# Patient Record
Sex: FEMALE | Race: WHITE | NOT HISPANIC OR LATINO | Employment: FULL TIME | URBAN - METROPOLITAN AREA
[De-identification: names, ages, dates, MRNs, and addresses within clinical notes are randomized per-mention and may not be internally consistent; named-entity substitution may affect disease eponyms.]

---

## 2017-01-01 ENCOUNTER — HOSPITAL ENCOUNTER (OUTPATIENT)
Facility: HOSPITAL | Age: 62
Setting detail: OBSERVATION
Discharge: HOME | End: 2017-05-25
Attending: EMERGENCY MEDICINE | Admitting: INTERNAL MEDICINE

## 2017-01-01 ENCOUNTER — APPOINTMENT (OUTPATIENT)
Dept: NUCLEAR MEDICINE | Facility: HOSPITAL | Age: 62
End: 2017-01-01

## 2017-01-01 ENCOUNTER — APPOINTMENT (OUTPATIENT)
Dept: CARDIOLOGY | Facility: HOSPITAL | Age: 62
End: 2017-01-01
Attending: INTERNAL MEDICINE

## 2017-01-01 ENCOUNTER — HOSPITAL ENCOUNTER (INPATIENT)
Facility: HOSPITAL | Age: 62
LOS: 2 days | Discharge: HOME | End: 2017-12-19
Attending: EMERGENCY MEDICINE | Admitting: INTERNAL MEDICINE

## 2017-01-01 ENCOUNTER — APPOINTMENT (OUTPATIENT)
Dept: CT IMAGING | Facility: HOSPITAL | Age: 62
End: 2017-01-01

## 2017-01-01 ENCOUNTER — APPOINTMENT (OUTPATIENT)
Dept: CARDIOLOGY | Facility: HOSPITAL | Age: 62
End: 2017-01-01

## 2017-01-01 ENCOUNTER — TRANSCRIBE ORDERS (OUTPATIENT)
Dept: ADMINISTRATIVE | Facility: HOSPITAL | Age: 62
End: 2017-01-01

## 2017-01-01 ENCOUNTER — APPOINTMENT (OUTPATIENT)
Dept: MRI IMAGING | Facility: HOSPITAL | Age: 62
End: 2017-01-01
Attending: INTERNAL MEDICINE

## 2017-01-01 ENCOUNTER — HOSPITAL ENCOUNTER (OUTPATIENT)
Dept: ULTRASOUND IMAGING | Facility: HOSPITAL | Age: 62
Discharge: HOME | End: 2017-12-07
Admitting: INTERNAL MEDICINE

## 2017-01-01 ENCOUNTER — HOSPITAL ENCOUNTER (OUTPATIENT)
Dept: CT IMAGING | Facility: HOSPITAL | Age: 62
Discharge: HOME | End: 2017-05-22
Admitting: INTERNAL MEDICINE

## 2017-01-01 ENCOUNTER — TELEPHONE (OUTPATIENT)
Dept: ONCOLOGY | Facility: CLINIC | Age: 62
End: 2017-01-01

## 2017-01-01 VITALS
DIASTOLIC BLOOD PRESSURE: 83 MMHG | HEART RATE: 77 BPM | BODY MASS INDEX: 34.66 KG/M2 | HEIGHT: 65 IN | WEIGHT: 208 LBS | TEMPERATURE: 97.8 F | OXYGEN SATURATION: 97 % | SYSTOLIC BLOOD PRESSURE: 149 MMHG | RESPIRATION RATE: 18 BRPM

## 2017-01-01 VITALS
RESPIRATION RATE: 20 BRPM | SYSTOLIC BLOOD PRESSURE: 171 MMHG | OXYGEN SATURATION: 97 % | HEART RATE: 66 BPM | DIASTOLIC BLOOD PRESSURE: 86 MMHG | BODY MASS INDEX: 33.27 KG/M2 | TEMPERATURE: 98.2 F | HEIGHT: 66 IN | WEIGHT: 207 LBS

## 2017-01-01 DIAGNOSIS — R10.30 LOWER ABDOMINAL PAIN: ICD-10-CM

## 2017-01-01 DIAGNOSIS — R10.2 PELVIC PAIN: ICD-10-CM

## 2017-01-01 DIAGNOSIS — K57.32 SIGMOID DIVERTICULITIS: ICD-10-CM

## 2017-01-01 DIAGNOSIS — I26.99 BILATERAL PULMONARY EMBOLISM (HCC): ICD-10-CM

## 2017-01-01 DIAGNOSIS — R10.30 LOWER ABDOMINAL PAIN: Primary | ICD-10-CM

## 2017-01-01 DIAGNOSIS — I26.09 OTHER ACUTE PULMONARY EMBOLISM WITH ACUTE COR PULMONALE (HCC): ICD-10-CM

## 2017-01-01 DIAGNOSIS — I82.401 ACUTE DEEP VEIN THROMBOSIS (DVT) OF RIGHT LOWER EXTREMITY, UNSPECIFIED VEIN (HCC): Primary | ICD-10-CM

## 2017-01-01 DIAGNOSIS — R10.2 PELVIC PAIN: Primary | ICD-10-CM

## 2017-01-01 DIAGNOSIS — R55 NEAR SYNCOPE: Primary | ICD-10-CM

## 2017-01-01 LAB
ALBUMIN SERPL-MCNC: 3.8 G/DL (ref 3.5–5.2)
ALBUMIN SERPL-MCNC: 4.1 G/DL (ref 3.5–5.2)
ALBUMIN/GLOB SERPL: 0.9 G/DL
ALBUMIN/GLOB SERPL: 1.1 G/DL
ALP SERPL-CCNC: 101 U/L (ref 39–117)
ALP SERPL-CCNC: 91 U/L (ref 39–117)
ALT SERPL W P-5'-P-CCNC: 19 U/L (ref 1–33)
ALT SERPL W P-5'-P-CCNC: 20 U/L (ref 1–33)
ANION GAP SERPL CALCULATED.3IONS-SCNC: 10.9 MMOL/L
ANION GAP SERPL CALCULATED.3IONS-SCNC: 13 MMOL/L
ANION GAP SERPL CALCULATED.3IONS-SCNC: 13.5 MMOL/L
ANION GAP SERPL CALCULATED.3IONS-SCNC: 13.9 MMOL/L
ANION GAP SERPL CALCULATED.3IONS-SCNC: 7.7 MMOL/L
AORTIC DIMENSIONLESS INDEX: 0.9 (DI)
APTT PPP: 107.5 SECONDS (ref 22.7–35.4)
APTT PPP: 123.2 SECONDS (ref 22.7–35.4)
APTT PPP: 29.4 SECONDS (ref 22.7–35.4)
APTT PPP: 63.2 SECONDS (ref 22.7–35.4)
APTT PPP: 64.4 SECONDS (ref 22.7–35.4)
APTT PPP: 67.9 SECONDS (ref 22.7–35.4)
APTT PPP: 92.2 SECONDS (ref 22.7–35.4)
AST SERPL-CCNC: 23 U/L (ref 1–32)
AST SERPL-CCNC: 26 U/L (ref 1–32)
BASOPHILS # BLD AUTO: 0.02 10*3/MM3 (ref 0–0.2)
BASOPHILS # BLD AUTO: 0.03 10*3/MM3 (ref 0–0.2)
BASOPHILS # BLD AUTO: 0.04 10*3/MM3 (ref 0–0.2)
BASOPHILS NFR BLD AUTO: 0.2 % (ref 0–1.5)
BASOPHILS NFR BLD AUTO: 0.3 % (ref 0–1.5)
BASOPHILS NFR BLD AUTO: 0.3 % (ref 0–1.5)
BASOPHILS NFR BLD AUTO: 0.4 % (ref 0–1.5)
BH CV ECHO MEAS - ACS: 1.8 CM
BH CV ECHO MEAS - ACS: 1.9 CM
BH CV ECHO MEAS - AI DEC SLOPE: 180.8 CM/SEC^2
BH CV ECHO MEAS - AI MAX PG: 63.3 MMHG
BH CV ECHO MEAS - AI MAX VEL: 397.5 CM/SEC
BH CV ECHO MEAS - AI P1/2T: 644.1 MSEC
BH CV ECHO MEAS - AO MAX PG: 9 MMHG
BH CV ECHO MEAS - AO MEAN PG (FULL): 1 MMHG
BH CV ECHO MEAS - AO MEAN PG (FULL): 2 MMHG
BH CV ECHO MEAS - AO MEAN PG: 5 MMHG
BH CV ECHO MEAS - AO MEAN PG: 5 MMHG
BH CV ECHO MEAS - AO ROOT AREA (BSA CORRECTED): 1.2
BH CV ECHO MEAS - AO ROOT AREA (BSA CORRECTED): 1.3
BH CV ECHO MEAS - AO ROOT AREA: 4.5 CM^2
BH CV ECHO MEAS - AO ROOT AREA: 5.7 CM^2
BH CV ECHO MEAS - AO ROOT DIAM: 2.4 CM
BH CV ECHO MEAS - AO ROOT DIAM: 2.7 CM
BH CV ECHO MEAS - AO V2 MAX: 146 CM/SEC
BH CV ECHO MEAS - AO V2 MEAN: 107 CM/SEC
BH CV ECHO MEAS - AO V2 MEAN: 108 CM/SEC
BH CV ECHO MEAS - AO V2 VTI: 24.1 CM
BH CV ECHO MEAS - AO V2 VTI: 34.6 CM
BH CV ECHO MEAS - AVA(I,A): 2.1 CM^2
BH CV ECHO MEAS - AVA(I,A): 2.7 CM^2
BH CV ECHO MEAS - AVA(I,D): 2.1 CM^2
BH CV ECHO MEAS - AVA(I,D): 2.7 CM^2
BH CV ECHO MEAS - BSA(HAYCOCK): 2.1 M^2
BH CV ECHO MEAS - BSA(HAYCOCK): 2.1 M^2
BH CV ECHO MEAS - BSA: 2 M^2
BH CV ECHO MEAS - BSA: 2 M^2
BH CV ECHO MEAS - BZI_BMI: 33.4 KILOGRAMS/M^2
BH CV ECHO MEAS - BZI_BMI: 34.6 KILOGRAMS/M^2
BH CV ECHO MEAS - BZI_METRIC_HEIGHT: 165.1 CM
BH CV ECHO MEAS - BZI_METRIC_HEIGHT: 167.6 CM
BH CV ECHO MEAS - BZI_METRIC_WEIGHT: 93.9 KG
BH CV ECHO MEAS - BZI_METRIC_WEIGHT: 94.3 KG
BH CV ECHO MEAS - CONTRAST EF (2CH): 74.6 ML/M^2
BH CV ECHO MEAS - CONTRAST EF (2CH): 75.2 ML/M^2
BH CV ECHO MEAS - CONTRAST EF 4CH: 74.6 ML/M^2
BH CV ECHO MEAS - CONTRAST EF 4CH: 76.5 ML/M^2
BH CV ECHO MEAS - EDV(CUBED): 39.3 ML
BH CV ECHO MEAS - EDV(CUBED): 85.2 ML
BH CV ECHO MEAS - EDV(MOD-SP2): 117 ML
BH CV ECHO MEAS - EDV(MOD-SP2): 59 ML
BH CV ECHO MEAS - EDV(MOD-SP4): 118 ML
BH CV ECHO MEAS - EDV(MOD-SP4): 68 ML
BH CV ECHO MEAS - EDV(TEICH): 47.4 ML
BH CV ECHO MEAS - EDV(TEICH): 87.7 ML
BH CV ECHO MEAS - EF(CUBED): 79.6 %
BH CV ECHO MEAS - EF(CUBED): 81.7 %
BH CV ECHO MEAS - EF(MOD-SP2): 74.6 %
BH CV ECHO MEAS - EF(MOD-SP2): 75.2 %
BH CV ECHO MEAS - EF(MOD-SP4): 74.6 %
BH CV ECHO MEAS - EF(MOD-SP4): 76.5 %
BH CV ECHO MEAS - EF(TEICH): 73.2 %
BH CV ECHO MEAS - EF(TEICH): 74.5 %
BH CV ECHO MEAS - ESV(CUBED): 15.6 ML
BH CV ECHO MEAS - ESV(CUBED): 8 ML
BH CV ECHO MEAS - ESV(MOD-SP2): 15 ML
BH CV ECHO MEAS - ESV(MOD-SP2): 29 ML
BH CV ECHO MEAS - ESV(MOD-SP4): 16 ML
BH CV ECHO MEAS - ESV(MOD-SP4): 30 ML
BH CV ECHO MEAS - ESV(TEICH): 12.7 ML
BH CV ECHO MEAS - ESV(TEICH): 22.3 ML
BH CV ECHO MEAS - FS: 41.2 %
BH CV ECHO MEAS - FS: 43.2 %
BH CV ECHO MEAS - IVS/LVPW: 0.8
BH CV ECHO MEAS - IVS/LVPW: 1
BH CV ECHO MEAS - IVSD: 0.8 CM
BH CV ECHO MEAS - IVSD: 1.1 CM
BH CV ECHO MEAS - LAT PEAK E' VEL: 10 CM/SEC
BH CV ECHO MEAS - LAT PEAK E' VEL: 11 CM/SEC
BH CV ECHO MEAS - LV DIASTOLIC VOL/BSA (35-75): 33.8 ML/M^2
BH CV ECHO MEAS - LV DIASTOLIC VOL/BSA (35-75): 58.1 ML/M^2
BH CV ECHO MEAS - LV MASS(C)D: 168.9 GRAMS
BH CV ECHO MEAS - LV MASS(C)D: 84.9 GRAMS
BH CV ECHO MEAS - LV MASS(C)DI: 42.2 GRAMS/M^2
BH CV ECHO MEAS - LV MASS(C)DI: 83.2 GRAMS/M^2
BH CV ECHO MEAS - LV MEAN PG: 3 MMHG
BH CV ECHO MEAS - LV MEAN PG: 4 MMHG
BH CV ECHO MEAS - LV SYSTOLIC VOL/BSA (12-30): 14.8 ML/M^2
BH CV ECHO MEAS - LV SYSTOLIC VOL/BSA (12-30): 8 ML/M^2
BH CV ECHO MEAS - LV V1 MAX: 125 CM/SEC
BH CV ECHO MEAS - LV V1 MEAN: 75.8 CM/SEC
BH CV ECHO MEAS - LV V1 MEAN: 90.7 CM/SEC
BH CV ECHO MEAS - LV V1 VTI: 20.7 CM
BH CV ECHO MEAS - LV V1 VTI: 28.2 CM
BH CV ECHO MEAS - LVIDD: 3.4 CM
BH CV ECHO MEAS - LVIDD: 4.4 CM
BH CV ECHO MEAS - LVIDS: 2 CM
BH CV ECHO MEAS - LVIDS: 2.5 CM
BH CV ECHO MEAS - LVLD AP2: 6.9 CM
BH CV ECHO MEAS - LVLD AP2: 8.4 CM
BH CV ECHO MEAS - LVLD AP4: 6.8 CM
BH CV ECHO MEAS - LVLD AP4: 8.4 CM
BH CV ECHO MEAS - LVLS AP2: 5.5 CM
BH CV ECHO MEAS - LVLS AP2: 6 CM
BH CV ECHO MEAS - LVLS AP4: 4.6 CM
BH CV ECHO MEAS - LVLS AP4: 6.3 CM
BH CV ECHO MEAS - LVOT AREA (M): 2.5 CM^2
BH CV ECHO MEAS - LVOT AREA (M): 3.1 CM^2
BH CV ECHO MEAS - LVOT AREA: 2.5 CM^2
BH CV ECHO MEAS - LVOT AREA: 3.1 CM^2
BH CV ECHO MEAS - LVOT DIAM: 1.8 CM
BH CV ECHO MEAS - LVOT DIAM: 2 CM
BH CV ECHO MEAS - LVPWD: 1 CM
BH CV ECHO MEAS - LVPWD: 1.1 CM
BH CV ECHO MEAS - MED PEAK E' VEL: 11 CM/SEC
BH CV ECHO MEAS - MED PEAK E' VEL: 7 CM/SEC
BH CV ECHO MEAS - MR MAX PG: 49.6 MMHG
BH CV ECHO MEAS - MR MAX VEL: 352 CM/SEC
BH CV ECHO MEAS - MV A DUR: 0.1 SEC
BH CV ECHO MEAS - MV A DUR: 0.11 SEC
BH CV ECHO MEAS - MV A MAX VEL: 101 CM/SEC
BH CV ECHO MEAS - MV A MAX VEL: 102 CM/SEC
BH CV ECHO MEAS - MV DEC SLOPE: 278 CM/SEC^2
BH CV ECHO MEAS - MV DEC SLOPE: 380 CM/SEC^2
BH CV ECHO MEAS - MV DEC TIME: 0.19 SEC
BH CV ECHO MEAS - MV DEC TIME: 0.22 SEC
BH CV ECHO MEAS - MV E MAX VEL: 63.2 CM/SEC
BH CV ECHO MEAS - MV E MAX VEL: 94.3 CM/SEC
BH CV ECHO MEAS - MV E/A: 0.62
BH CV ECHO MEAS - MV E/A: 0.93
BH CV ECHO MEAS - MV MAX PG: 7 MMHG
BH CV ECHO MEAS - MV MEAN PG: 2 MMHG
BH CV ECHO MEAS - MV MEAN PG: 2 MMHG
BH CV ECHO MEAS - MV P1/2T MAX VEL: 56.2 CM/SEC
BH CV ECHO MEAS - MV P1/2T MAX VEL: 99.1 CM/SEC
BH CV ECHO MEAS - MV P1/2T: 59.2 MSEC
BH CV ECHO MEAS - MV P1/2T: 76.4 MSEC
BH CV ECHO MEAS - MV V2 MEAN: 61.9 CM/SEC
BH CV ECHO MEAS - MV V2 MEAN: 66.6 CM/SEC
BH CV ECHO MEAS - MV V2 VTI: 16.9 CM
BH CV ECHO MEAS - MV V2 VTI: 34.4 CM
BH CV ECHO MEAS - MVA P1/2T LCG: 2.2 CM^2
BH CV ECHO MEAS - MVA P1/2T LCG: 3.9 CM^2
BH CV ECHO MEAS - MVA(P1/2T): 2.9 CM^2
BH CV ECHO MEAS - MVA(P1/2T): 3.7 CM^2
BH CV ECHO MEAS - MVA(VTI): 2.1 CM^2
BH CV ECHO MEAS - MVA(VTI): 3.8 CM^2
BH CV ECHO MEAS - PA ACC SLOPE: 19.3 CM/SEC^2
BH CV ECHO MEAS - PA ACC SLOPE: 654 CM/SEC^2
BH CV ECHO MEAS - PA ACC TIME: 0.07 SEC
BH CV ECHO MEAS - PA ACC TIME: 0.11 SEC
BH CV ECHO MEAS - PA MAX PG (FULL): 0.84 MMHG
BH CV ECHO MEAS - PA MAX PG (FULL): 1.4 MMHG
BH CV ECHO MEAS - PA MAX PG: 2.1 MMHG
BH CV ECHO MEAS - PA MAX PG: 3.5 MMHG
BH CV ECHO MEAS - PA PR(ACCEL): 30 MMHG
BH CV ECHO MEAS - PA PR(ACCEL): 47.5 MMHG
BH CV ECHO MEAS - PA V2 MAX: 72.1 CM/SEC
BH CV ECHO MEAS - PA V2 MAX: 93.7 CM/SEC
BH CV ECHO MEAS - PULM A REVS DUR: 0.13 SEC
BH CV ECHO MEAS - PULM A REVS VEL: 38.5 CM/SEC
BH CV ECHO MEAS - PULM DIAS VEL: 34.6 CM/SEC
BH CV ECHO MEAS - PULM S/D: 1.8
BH CV ECHO MEAS - PULM SYS VEL: 63.7 CM/SEC
BH CV ECHO MEAS - PVA(V,A): 1 CM^2
BH CV ECHO MEAS - PVA(V,A): 1.8 CM^2
BH CV ECHO MEAS - PVA(V,D): 1 CM^2
BH CV ECHO MEAS - PVA(V,D): 1.8 CM^2
BH CV ECHO MEAS - QP/QS: 0.32
BH CV ECHO MEAS - QP/QS: 0.36
BH CV ECHO MEAS - RAP SYSTOLE: 3 MMHG
BH CV ECHO MEAS - RAP SYSTOLE: 8 MMHG
BH CV ECHO MEAS - RV MAX PG: 1.2 MMHG
BH CV ECHO MEAS - RV MAX PG: 2.1 MMHG
BH CV ECHO MEAS - RV MEAN PG: 1 MMHG
BH CV ECHO MEAS - RV MEAN PG: 1 MMHG
BH CV ECHO MEAS - RV V1 MAX: 55.6 CM/SEC
BH CV ECHO MEAS - RV V1 MAX: 72.2 CM/SEC
BH CV ECHO MEAS - RV V1 MEAN: 43.3 CM/SEC
BH CV ECHO MEAS - RV V1 MEAN: 48 CM/SEC
BH CV ECHO MEAS - RV V1 VTI: 10.3 CM
BH CV ECHO MEAS - RV V1 VTI: 17.4 CM
BH CV ECHO MEAS - RVOT AREA: 1.3 CM^2
BH CV ECHO MEAS - RVOT AREA: 2.3 CM^2
BH CV ECHO MEAS - RVOT DIAM: 1.3 CM
BH CV ECHO MEAS - RVOT DIAM: 1.7 CM
BH CV ECHO MEAS - RVSP: 31.1 MMHG
BH CV ECHO MEAS - RVSP: 50.3 MMHG
BH CV ECHO MEAS - SI(AO): 68.6 ML/M^2
BH CV ECHO MEAS - SI(AO): 77.1 ML/M^2
BH CV ECHO MEAS - SI(CUBED): 15.6 ML/M^2
BH CV ECHO MEAS - SI(CUBED): 34.3 ML/M^2
BH CV ECHO MEAS - SI(LVOT): 32.3 ML/M^2
BH CV ECHO MEAS - SI(LVOT): 35.4 ML/M^2
BH CV ECHO MEAS - SI(MOD-SP2): 21.9 ML/M^2
BH CV ECHO MEAS - SI(MOD-SP2): 43.4 ML/M^2
BH CV ECHO MEAS - SI(MOD-SP4): 25.9 ML/M^2
BH CV ECHO MEAS - SI(MOD-SP4): 43.4 ML/M^2
BH CV ECHO MEAS - SI(TEICH): 17.3 ML/M^2
BH CV ECHO MEAS - SI(TEICH): 32.2 ML/M^2
BH CV ECHO MEAS - SV(AO): 138 ML
BH CV ECHO MEAS - SV(AO): 156.5 ML
BH CV ECHO MEAS - SV(CUBED): 31.3 ML
BH CV ECHO MEAS - SV(CUBED): 69.6 ML
BH CV ECHO MEAS - SV(LVOT): 65 ML
BH CV ECHO MEAS - SV(LVOT): 71.8 ML
BH CV ECHO MEAS - SV(MOD-SP2): 44 ML
BH CV ECHO MEAS - SV(MOD-SP2): 88 ML
BH CV ECHO MEAS - SV(MOD-SP4): 52 ML
BH CV ECHO MEAS - SV(MOD-SP4): 88 ML
BH CV ECHO MEAS - SV(RVOT): 23.1 ML
BH CV ECHO MEAS - SV(RVOT): 23.4 ML
BH CV ECHO MEAS - SV(TEICH): 34.7 ML
BH CV ECHO MEAS - SV(TEICH): 65.4 ML
BH CV ECHO MEAS - TAPSE (>1.6): 1.1 CM2
BH CV ECHO MEAS - TAPSE (>1.6): 1.4 CM2
BH CV ECHO MEAS - TR MAX VEL: 265 CM/SEC
BH CV ECHO MEAS - TR MAX VEL: 325 CM/SEC
BH CV LOW VAS RIGHT DISTAL FEMORAL SPONT: 1
BH CV LOW VAS RIGHT GASTRONEMIUS VESSEL: 1
BH CV LOW VAS RIGHT LESSER SAPH VESSEL: 1
BH CV LOW VAS RIGHT MID FEMORAL SPONT: 1
BH CV LOW VAS RIGHT PERONEAL VESSEL: 1
BH CV LOW VAS RIGHT POPLITEAL SPONT: 1
BH CV LOW VAS RIGHT POSTERIOR TIBIAL VESSEL: 1
BH CV LOW VAS RIGHT PROXIMAL FEMORAL SPONT: 1
BH CV LOWER VASCULAR LEFT COMMON FEMORAL AUGMENT: NORMAL
BH CV LOWER VASCULAR LEFT COMMON FEMORAL COMPETENT: NORMAL
BH CV LOWER VASCULAR LEFT COMMON FEMORAL COMPRESS: NORMAL
BH CV LOWER VASCULAR LEFT COMMON FEMORAL PHASIC: NORMAL
BH CV LOWER VASCULAR LEFT COMMON FEMORAL SPONT: NORMAL
BH CV LOWER VASCULAR RIGHT COMMON FEMORAL AUGMENT: NORMAL
BH CV LOWER VASCULAR RIGHT COMMON FEMORAL COMPETENT: NORMAL
BH CV LOWER VASCULAR RIGHT COMMON FEMORAL COMPRESS: NORMAL
BH CV LOWER VASCULAR RIGHT COMMON FEMORAL PHASIC: NORMAL
BH CV LOWER VASCULAR RIGHT COMMON FEMORAL SPONT: NORMAL
BH CV LOWER VASCULAR RIGHT DISTAL FEMORAL COMPRESS: NORMAL
BH CV LOWER VASCULAR RIGHT DISTAL FEMORAL THROMBUS: NORMAL
BH CV LOWER VASCULAR RIGHT GASTRONEMIUS COMPRESS: NORMAL
BH CV LOWER VASCULAR RIGHT GASTRONEMIUS THROMBUS: NORMAL
BH CV LOWER VASCULAR RIGHT GREATER SAPH AK COMPRESS: NORMAL
BH CV LOWER VASCULAR RIGHT GREATER SAPH BK COMPRESS: NORMAL
BH CV LOWER VASCULAR RIGHT LESSER SAPH COMPRESS: NORMAL
BH CV LOWER VASCULAR RIGHT LESSER SAPH THROMBUS: NORMAL
BH CV LOWER VASCULAR RIGHT MID FEMORAL AUGMENT: NORMAL
BH CV LOWER VASCULAR RIGHT MID FEMORAL COMPRESS: NORMAL
BH CV LOWER VASCULAR RIGHT MID FEMORAL PHASIC: NORMAL
BH CV LOWER VASCULAR RIGHT MID FEMORAL SPONT: NORMAL
BH CV LOWER VASCULAR RIGHT MID FEMORAL THROMBUS: NORMAL
BH CV LOWER VASCULAR RIGHT PERONEAL COMPRESS: NORMAL
BH CV LOWER VASCULAR RIGHT PERONEAL THROMBUS: NORMAL
BH CV LOWER VASCULAR RIGHT POPLITEAL AUGMENT: NORMAL
BH CV LOWER VASCULAR RIGHT POPLITEAL COMPRESS: NORMAL
BH CV LOWER VASCULAR RIGHT POPLITEAL PHASIC: NORMAL
BH CV LOWER VASCULAR RIGHT POPLITEAL SPONT: NORMAL
BH CV LOWER VASCULAR RIGHT POPLITEAL THROMBUS: NORMAL
BH CV LOWER VASCULAR RIGHT POSTERIOR TIBIAL COMPRESS: NORMAL
BH CV LOWER VASCULAR RIGHT POSTERIOR TIBIAL THROMBUS: NORMAL
BH CV LOWER VASCULAR RIGHT PROXIMAL FEMORAL COMPRESS: NORMAL
BH CV LOWER VASCULAR RIGHT PROXIMAL FEMORAL THROMBUS: NORMAL
BH CV LOWER VASCULAR RIGHT SAPHENOFEMORAL JUNCTION AUGMENT: NORMAL
BH CV LOWER VASCULAR RIGHT SAPHENOFEMORAL JUNCTION COMPETENT: NORMAL
BH CV LOWER VASCULAR RIGHT SAPHENOFEMORAL JUNCTION COMPRESS: NORMAL
BH CV LOWER VASCULAR RIGHT SAPHENOFEMORAL JUNCTION PHASIC: NORMAL
BH CV LOWER VASCULAR RIGHT SAPHENOFEMORAL JUNCTION SPONT: NORMAL
BH CV STRESS BP STAGE 1: NORMAL
BH CV STRESS BP STAGE 2: NORMAL
BH CV STRESS DURATION MIN STAGE 1: 3
BH CV STRESS DURATION MIN STAGE 2: 2
BH CV STRESS DURATION SEC STAGE 1: 0
BH CV STRESS DURATION SEC STAGE 2: 0
BH CV STRESS GRADE STAGE 1: 10
BH CV STRESS GRADE STAGE 2: 12
BH CV STRESS HR STAGE 1: 123
BH CV STRESS HR STAGE 2: 144
BH CV STRESS METS STAGE 1: 4.6
BH CV STRESS METS STAGE 2: 6.2
BH CV STRESS PROTOCOL 1: NORMAL
BH CV STRESS RECOVERY BP: NORMAL MMHG
BH CV STRESS RECOVERY HR: 77 BPM
BH CV STRESS SPEED STAGE 1: 1.7
BH CV STRESS SPEED STAGE 2: 2.5
BH CV STRESS STAGE 1: 1
BH CV STRESS STAGE 2: 2
BH CV VAS BP RIGHT ARM: NORMAL MMHG
BH CV XLRA - RV BASE: 2.7 CM
BH CV XLRA - RV BASE: 3.2 CM
BH CV XLRA - TDI S': 10 CM/SEC
BH CV XLRA - TDI S': 9 CM/SEC
BILIRUB SERPL-MCNC: 0.3 MG/DL (ref 0.1–1.2)
BILIRUB SERPL-MCNC: 0.4 MG/DL (ref 0.1–1.2)
BUN BLD-MCNC: 13 MG/DL (ref 8–23)
BUN BLD-MCNC: 15 MG/DL (ref 8–23)
BUN BLD-MCNC: 16 MG/DL (ref 8–23)
BUN BLD-MCNC: 19 MG/DL (ref 8–23)
BUN BLD-MCNC: 22 MG/DL (ref 8–23)
BUN/CREAT SERPL: 13.8 (ref 7–25)
BUN/CREAT SERPL: 15.6 (ref 7–25)
BUN/CREAT SERPL: 18.4 (ref 7–25)
BUN/CREAT SERPL: 19.6 (ref 7–25)
BUN/CREAT SERPL: 20.2 (ref 7–25)
CALCIUM SPEC-SCNC: 9.2 MG/DL (ref 8.6–10.5)
CALCIUM SPEC-SCNC: 9.3 MG/DL (ref 8.6–10.5)
CALCIUM SPEC-SCNC: 9.5 MG/DL (ref 8.6–10.5)
CALCIUM SPEC-SCNC: 9.8 MG/DL (ref 8.6–10.5)
CALCIUM SPEC-SCNC: 9.8 MG/DL (ref 8.6–10.5)
CHLORIDE SERPL-SCNC: 104 MMOL/L (ref 98–107)
CHLORIDE SERPL-SCNC: 98 MMOL/L (ref 98–107)
CHOLEST SERPL-MCNC: 171 MG/DL (ref 0–200)
CO2 SERPL-SCNC: 22 MMOL/L (ref 22–29)
CO2 SERPL-SCNC: 22.5 MMOL/L (ref 22–29)
CO2 SERPL-SCNC: 24.1 MMOL/L (ref 22–29)
CO2 SERPL-SCNC: 25.1 MMOL/L (ref 22–29)
CO2 SERPL-SCNC: 27.3 MMOL/L (ref 22–29)
CREAT BLD-MCNC: 0.87 MG/DL (ref 0.57–1)
CREAT BLD-MCNC: 0.94 MG/DL (ref 0.57–1)
CREAT BLD-MCNC: 0.94 MG/DL (ref 0.57–1)
CREAT BLD-MCNC: 0.96 MG/DL (ref 0.57–1)
CREAT BLD-MCNC: 1.12 MG/DL (ref 0.57–1)
CREAT BLDA-MCNC: 0.9 MG/DL (ref 0.6–1.3)
DEPRECATED RDW RBC AUTO: 41.8 FL (ref 37–54)
DEPRECATED RDW RBC AUTO: 42.7 FL (ref 37–54)
DEPRECATED RDW RBC AUTO: 44.7 FL (ref 37–54)
DEPRECATED RDW RBC AUTO: 44.8 FL (ref 37–54)
DEPRECATED RDW RBC AUTO: 45.2 FL (ref 37–54)
DEPRECATED RDW RBC AUTO: 46.5 FL (ref 37–54)
E/E' RATIO: 12
E/E' RATIO: 6
EOSINOPHIL # BLD AUTO: 0.07 10*3/MM3 (ref 0–0.7)
EOSINOPHIL # BLD AUTO: 0.07 10*3/MM3 (ref 0–0.7)
EOSINOPHIL # BLD AUTO: 0.09 10*3/MM3 (ref 0–0.7)
EOSINOPHIL # BLD AUTO: 0.09 10*3/MM3 (ref 0–0.7)
EOSINOPHIL # BLD AUTO: 0.11 10*3/MM3 (ref 0–0.7)
EOSINOPHIL # BLD AUTO: 0.12 10*3/MM3 (ref 0–0.7)
EOSINOPHIL NFR BLD AUTO: 0.8 % (ref 0.3–6.2)
EOSINOPHIL NFR BLD AUTO: 1.4 % (ref 0.3–6.2)
EOSINOPHIL NFR BLD AUTO: 1.6 % (ref 0.3–6.2)
ERYTHROCYTE [DISTWIDTH] IN BLOOD BY AUTOMATED COUNT: 13.3 % (ref 11.7–13)
ERYTHROCYTE [DISTWIDTH] IN BLOOD BY AUTOMATED COUNT: 13.4 % (ref 11.7–13)
ERYTHROCYTE [DISTWIDTH] IN BLOOD BY AUTOMATED COUNT: 13.6 % (ref 11.7–13)
ERYTHROCYTE [DISTWIDTH] IN BLOOD BY AUTOMATED COUNT: 13.7 % (ref 11.7–13)
ERYTHROCYTE [DISTWIDTH] IN BLOOD BY AUTOMATED COUNT: 13.8 % (ref 11.7–13)
ERYTHROCYTE [DISTWIDTH] IN BLOOD BY AUTOMATED COUNT: 14 % (ref 11.7–13)
GFR SERPL CREATININE-BSD FRML MDRD: 49 ML/MIN/1.73
GFR SERPL CREATININE-BSD FRML MDRD: 59 ML/MIN/1.73
GFR SERPL CREATININE-BSD FRML MDRD: 60 ML/MIN/1.73
GFR SERPL CREATININE-BSD FRML MDRD: 60 ML/MIN/1.73
GFR SERPL CREATININE-BSD FRML MDRD: 66 ML/MIN/1.73
GLOBULIN UR ELPH-MCNC: 3.6 GM/DL
GLOBULIN UR ELPH-MCNC: 4.1 GM/DL
GLUCOSE BLD-MCNC: 106 MG/DL (ref 65–99)
GLUCOSE BLD-MCNC: 115 MG/DL (ref 65–99)
GLUCOSE BLD-MCNC: 118 MG/DL (ref 65–99)
GLUCOSE BLD-MCNC: 119 MG/DL (ref 65–99)
GLUCOSE BLD-MCNC: 131 MG/DL (ref 65–99)
HBA1C MFR BLD: 5.49 % (ref 4.8–5.6)
HCT VFR BLD AUTO: 40.7 % (ref 35.6–45.5)
HCT VFR BLD AUTO: 41.4 % (ref 35.6–45.5)
HCT VFR BLD AUTO: 42.1 % (ref 35.6–45.5)
HCT VFR BLD AUTO: 45.3 % (ref 35.6–45.5)
HCT VFR BLD AUTO: 45.9 % (ref 35.6–45.5)
HCT VFR BLD AUTO: 46.9 % (ref 35.6–45.5)
HCT VFR BLDA CALC: 39 % (ref 38–51)
HDLC SERPL-MCNC: 66 MG/DL (ref 40–60)
HGB BLD-MCNC: 13.4 G/DL (ref 11.9–15.5)
HGB BLD-MCNC: 13.4 G/DL (ref 11.9–15.5)
HGB BLD-MCNC: 14.2 G/DL (ref 11.9–15.5)
HGB BLD-MCNC: 14.6 G/DL (ref 11.9–15.5)
HGB BLD-MCNC: 14.8 G/DL (ref 11.9–15.5)
HGB BLD-MCNC: 14.8 G/DL (ref 11.9–15.5)
HGB BLDA-MCNC: 13.3 G/DL (ref 12–17)
IMM GRANULOCYTES # BLD: 0.02 10*3/MM3 (ref 0–0.03)
IMM GRANULOCYTES # BLD: 0.03 10*3/MM3 (ref 0–0.03)
IMM GRANULOCYTES NFR BLD: 0.2 % (ref 0–0.5)
IMM GRANULOCYTES NFR BLD: 0.3 % (ref 0–0.5)
IMM GRANULOCYTES NFR BLD: 0.3 % (ref 0–0.5)
INR PPP: 1.06 (ref 0.9–1.1)
INR PPP: 1.09 (ref 0.9–1.1)
INR PPP: 1.14 (ref 0.9–1.1)
LDLC SERPL CALC-MCNC: 92 MG/DL (ref 0–100)
LDLC/HDLC SERPL: 1.4 {RATIO}
LEFT ATRIUM VOLUME INDEX: 15 ML/M2
LEFT ATRIUM VOLUME INDEX: 19 ML/M2
LV EF NUC BP: 73 %
LYMPHOCYTES # BLD AUTO: 1.46 10*3/MM3 (ref 0.9–4.8)
LYMPHOCYTES # BLD AUTO: 1.87 10*3/MM3 (ref 0.9–4.8)
LYMPHOCYTES # BLD AUTO: 2.06 10*3/MM3 (ref 0.9–4.8)
LYMPHOCYTES # BLD AUTO: 2.2 10*3/MM3 (ref 0.9–4.8)
LYMPHOCYTES # BLD AUTO: 2.41 10*3/MM3 (ref 0.9–4.8)
LYMPHOCYTES # BLD AUTO: 2.55 10*3/MM3 (ref 0.9–4.8)
LYMPHOCYTES NFR BLD AUTO: 12.6 % (ref 19.6–45.3)
LYMPHOCYTES NFR BLD AUTO: 23.2 % (ref 19.6–45.3)
LYMPHOCYTES NFR BLD AUTO: 24.3 % (ref 19.6–45.3)
LYMPHOCYTES NFR BLD AUTO: 25.5 % (ref 19.6–45.3)
LYMPHOCYTES NFR BLD AUTO: 26.5 % (ref 19.6–45.3)
LYMPHOCYTES NFR BLD AUTO: 26.7 % (ref 19.6–45.3)
MAXIMAL PREDICTED HEART RATE: 156 BPM
MAXIMAL PREDICTED HEART RATE: 157 BPM
MCH RBC QN AUTO: 28.6 PG (ref 26.9–32)
MCH RBC QN AUTO: 28.8 PG (ref 26.9–32)
MCH RBC QN AUTO: 28.9 PG (ref 26.9–32)
MCH RBC QN AUTO: 29 PG (ref 26.9–32)
MCH RBC QN AUTO: 29.1 PG (ref 26.9–32)
MCH RBC QN AUTO: 29.3 PG (ref 26.9–32)
MCHC RBC AUTO-ENTMCNC: 31.1 G/DL (ref 32.4–36.3)
MCHC RBC AUTO-ENTMCNC: 31.8 G/DL (ref 32.4–36.3)
MCHC RBC AUTO-ENTMCNC: 32.2 G/DL (ref 32.4–36.3)
MCHC RBC AUTO-ENTMCNC: 32.7 G/DL (ref 32.4–36.3)
MCHC RBC AUTO-ENTMCNC: 32.9 G/DL (ref 32.4–36.3)
MCHC RBC AUTO-ENTMCNC: 34.3 G/DL (ref 32.4–36.3)
MCV RBC AUTO: 85.5 FL (ref 80.5–98.2)
MCV RBC AUTO: 87 FL (ref 80.5–98.2)
MCV RBC AUTO: 88.8 FL (ref 80.5–98.2)
MCV RBC AUTO: 90.3 FL (ref 80.5–98.2)
MCV RBC AUTO: 90.4 FL (ref 80.5–98.2)
MCV RBC AUTO: 92.7 FL (ref 80.5–98.2)
MONOCYTES # BLD AUTO: 0.51 10*3/MM3 (ref 0.2–1.2)
MONOCYTES # BLD AUTO: 0.55 10*3/MM3 (ref 0.2–1.2)
MONOCYTES # BLD AUTO: 0.56 10*3/MM3 (ref 0.2–1.2)
MONOCYTES # BLD AUTO: 0.72 10*3/MM3 (ref 0.2–1.2)
MONOCYTES # BLD AUTO: 0.72 10*3/MM3 (ref 0.2–1.2)
MONOCYTES # BLD AUTO: 0.97 10*3/MM3 (ref 0.2–1.2)
MONOCYTES NFR BLD AUTO: 5.9 % (ref 5–12)
MONOCYTES NFR BLD AUTO: 6.5 % (ref 5–12)
MONOCYTES NFR BLD AUTO: 6.5 % (ref 5–12)
MONOCYTES NFR BLD AUTO: 7.9 % (ref 5–12)
MONOCYTES NFR BLD AUTO: 8 % (ref 5–12)
MONOCYTES NFR BLD AUTO: 8.3 % (ref 5–12)
NEUTROPHILS # BLD AUTO: 4.42 10*3/MM3 (ref 1.9–8.1)
NEUTROPHILS # BLD AUTO: 5.75 10*3/MM3 (ref 1.9–8.1)
NEUTROPHILS # BLD AUTO: 5.77 10*3/MM3 (ref 1.9–8.1)
NEUTROPHILS # BLD AUTO: 5.85 10*3/MM3 (ref 1.9–8.1)
NEUTROPHILS # BLD AUTO: 7.6 10*3/MM3 (ref 1.9–8.1)
NEUTROPHILS # BLD AUTO: 9.05 10*3/MM3 (ref 1.9–8.1)
NEUTROPHILS NFR BLD AUTO: 63.1 % (ref 42.7–76)
NEUTROPHILS NFR BLD AUTO: 64.2 % (ref 42.7–76)
NEUTROPHILS NFR BLD AUTO: 66.8 % (ref 42.7–76)
NEUTROPHILS NFR BLD AUTO: 68 % (ref 42.7–76)
NEUTROPHILS NFR BLD AUTO: 69.1 % (ref 42.7–76)
NEUTROPHILS NFR BLD AUTO: 77.7 % (ref 42.7–76)
NRBC BLD MANUAL-RTO: 0 /100 WBC (ref 0–0)
NRBC BLD MANUAL-RTO: 0 /100 WBC (ref 0–0)
NT-PROBNP SERPL-MCNC: 1788 PG/ML (ref 5–900)
PERCENT MAX PREDICTED HR: 91.72 %
PLATELET # BLD AUTO: 134 10*3/MM3 (ref 140–500)
PLATELET # BLD AUTO: 138 10*3/MM3 (ref 140–500)
PLATELET # BLD AUTO: 144 10*3/MM3 (ref 140–500)
PLATELET # BLD AUTO: 155 10*3/MM3 (ref 140–500)
PLATELET # BLD AUTO: 218 10*3/MM3 (ref 140–500)
PLATELET # BLD AUTO: 233 10*3/MM3 (ref 140–500)
PMV BLD AUTO: 10.2 FL (ref 6–12)
PMV BLD AUTO: 10.2 FL (ref 6–12)
PMV BLD AUTO: 9.4 FL (ref 6–12)
PMV BLD AUTO: 9.5 FL (ref 6–12)
PMV BLD AUTO: 9.5 FL (ref 6–12)
PMV BLD AUTO: 9.6 FL (ref 6–12)
POTASSIUM BLD-SCNC: 3.8 MMOL/L (ref 3.5–5.2)
POTASSIUM BLD-SCNC: 4.2 MMOL/L (ref 3.5–5.2)
POTASSIUM BLD-SCNC: 4.9 MMOL/L (ref 3.5–5.2)
PROT SERPL-MCNC: 7.7 G/DL (ref 6–8.5)
PROT SERPL-MCNC: 7.9 G/DL (ref 6–8.5)
PROTHROMBIN TIME: 13.4 SECONDS (ref 11.7–14.2)
PROTHROMBIN TIME: 13.7 SECONDS (ref 11.7–14.2)
PROTHROMBIN TIME: 14.2 SECONDS (ref 11.7–14.2)
RBC # BLD AUTO: 4.66 10*6/MM3 (ref 3.9–5.2)
RBC # BLD AUTO: 4.68 10*6/MM3 (ref 3.9–5.2)
RBC # BLD AUTO: 4.84 10*6/MM3 (ref 3.9–5.2)
RBC # BLD AUTO: 5.06 10*6/MM3 (ref 3.9–5.2)
RBC # BLD AUTO: 5.08 10*6/MM3 (ref 3.9–5.2)
RBC # BLD AUTO: 5.1 10*6/MM3 (ref 3.9–5.2)
SAO2 % BLDA: 80 % (ref 95–98)
SODIUM BLD-SCNC: 136 MMOL/L (ref 136–145)
SODIUM BLD-SCNC: 139 MMOL/L (ref 136–145)
SODIUM BLD-SCNC: 139 MMOL/L (ref 136–145)
SODIUM BLD-SCNC: 140 MMOL/L (ref 136–145)
SODIUM BLD-SCNC: 140 MMOL/L (ref 136–145)
STRESS BASELINE BP: NORMAL MMHG
STRESS BASELINE HR: 64 BPM
STRESS PERCENT HR: 108 %
STRESS POST ESTIMATED WORKLOAD: 6.2 METS
STRESS POST EXERCISE DUR MIN: 5 MIN
STRESS POST EXERCISE DUR SEC: 0 SEC
STRESS POST PEAK BP: NORMAL MMHG
STRESS POST PEAK HR: 144 BPM
STRESS TARGET HR: 133 BPM
STRESS TARGET HR: 133 BPM
TRIGL SERPL-MCNC: 63 MG/DL (ref 0–150)
TROPONIN T SERPL-MCNC: 0.03 NG/ML (ref 0–0.03)
TROPONIN T SERPL-MCNC: <0.01 NG/ML (ref 0–0.03)
VLDLC SERPL-MCNC: 12.6 MG/DL (ref 5–40)
WBC NRBC COR # BLD: 11 10*3/MM3 (ref 4.5–10.7)
WBC NRBC COR # BLD: 11.63 10*3/MM3 (ref 4.5–10.7)
WBC NRBC COR # BLD: 7 10*3/MM3 (ref 4.5–10.7)
WBC NRBC COR # BLD: 8.49 10*3/MM3 (ref 4.5–10.7)
WBC NRBC COR # BLD: 8.62 10*3/MM3 (ref 4.5–10.7)
WBC NRBC COR # BLD: 9.11 10*3/MM3 (ref 4.5–10.7)

## 2017-01-01 PROCEDURE — G0378 HOSPITAL OBSERVATION PER HR: HCPCS

## 2017-01-01 PROCEDURE — 93005 ELECTROCARDIOGRAM TRACING: CPT | Performed by: EMERGENCY MEDICINE

## 2017-01-01 PROCEDURE — 85025 COMPLETE CBC W/AUTO DIFF WBC: CPT | Performed by: INTERNAL MEDICINE

## 2017-01-01 PROCEDURE — 80048 BASIC METABOLIC PNL TOTAL CA: CPT | Performed by: INTERNAL MEDICINE

## 2017-01-01 PROCEDURE — 85598 HEXAGNAL PHOSPH PLTLT NEUTRL: CPT | Performed by: INTERNAL MEDICINE

## 2017-01-01 PROCEDURE — 96365 THER/PROPH/DIAG IV INF INIT: CPT

## 2017-01-01 PROCEDURE — 83036 HEMOGLOBIN GLYCOSYLATED A1C: CPT | Performed by: INTERNAL MEDICINE

## 2017-01-01 PROCEDURE — 78452 HT MUSCLE IMAGE SPECT MULT: CPT

## 2017-01-01 PROCEDURE — 93306 TTE W/DOPPLER COMPLETE: CPT | Performed by: INTERNAL MEDICINE

## 2017-01-01 PROCEDURE — 99285 EMERGENCY DEPT VISIT HI MDM: CPT

## 2017-01-01 PROCEDURE — 93568 NJX CAR CTH NSLC P-ART ANGRP: CPT | Performed by: INTERNAL MEDICINE

## 2017-01-01 PROCEDURE — 93005 ELECTROCARDIOGRAM TRACING: CPT | Performed by: INTERNAL MEDICINE

## 2017-01-01 PROCEDURE — 4A023N6 MEASUREMENT OF CARDIAC SAMPLING AND PRESSURE, RIGHT HEART, PERCUTANEOUS APPROACH: ICD-10-PCS | Performed by: INTERNAL MEDICINE

## 2017-01-01 PROCEDURE — 85018 HEMOGLOBIN: CPT

## 2017-01-01 PROCEDURE — 93010 ELECTROCARDIOGRAM REPORT: CPT | Performed by: INTERNAL MEDICINE

## 2017-01-01 PROCEDURE — 74177 CT ABD & PELVIS W/CONTRAST: CPT

## 2017-01-01 PROCEDURE — 99232 SBSQ HOSP IP/OBS MODERATE 35: CPT | Performed by: INTERNAL MEDICINE

## 2017-01-01 PROCEDURE — 96375 TX/PRO/DX INJ NEW DRUG ADDON: CPT

## 2017-01-01 PROCEDURE — 76830 TRANSVAGINAL US NON-OB: CPT

## 2017-01-01 PROCEDURE — 96360 HYDRATION IV INFUSION INIT: CPT

## 2017-01-01 PROCEDURE — A9500 TC99M SESTAMIBI: HCPCS | Performed by: INTERNAL MEDICINE

## 2017-01-01 PROCEDURE — 83880 ASSAY OF NATRIURETIC PEPTIDE: CPT | Performed by: NURSE PRACTITIONER

## 2017-01-01 PROCEDURE — 0 IOPAMIDOL PER 1 ML: Performed by: INTERNAL MEDICINE

## 2017-01-01 PROCEDURE — 25010000002 FENTANYL CITRATE (PF) 100 MCG/2ML SOLUTION: Performed by: INTERNAL MEDICINE

## 2017-01-01 PROCEDURE — 36415 COLL VENOUS BLD VENIPUNCTURE: CPT | Performed by: INTERNAL MEDICINE

## 2017-01-01 PROCEDURE — 85670 THROMBIN TIME PLASMA: CPT | Performed by: INTERNAL MEDICINE

## 2017-01-01 PROCEDURE — 85730 THROMBOPLASTIN TIME PARTIAL: CPT | Performed by: INTERNAL MEDICINE

## 2017-01-01 PROCEDURE — 85014 HEMATOCRIT: CPT

## 2017-01-01 PROCEDURE — 81241 F5 GENE: CPT | Performed by: INTERNAL MEDICINE

## 2017-01-01 PROCEDURE — 84484 ASSAY OF TROPONIN QUANT: CPT | Performed by: EMERGENCY MEDICINE

## 2017-01-01 PROCEDURE — C1894 INTRO/SHEATH, NON-LASER: HCPCS | Performed by: INTERNAL MEDICINE

## 2017-01-01 PROCEDURE — 78452 HT MUSCLE IMAGE SPECT MULT: CPT | Performed by: INTERNAL MEDICINE

## 2017-01-01 PROCEDURE — 71275 CT ANGIOGRAPHY CHEST: CPT

## 2017-01-01 PROCEDURE — 93016 CV STRESS TEST SUPVJ ONLY: CPT | Performed by: INTERNAL MEDICINE

## 2017-01-01 PROCEDURE — 80053 COMPREHEN METABOLIC PANEL: CPT | Performed by: NURSE PRACTITIONER

## 2017-01-01 PROCEDURE — 96361 HYDRATE IV INFUSION ADD-ON: CPT

## 2017-01-01 PROCEDURE — 93306 TTE W/DOPPLER COMPLETE: CPT

## 2017-01-01 PROCEDURE — 84484 ASSAY OF TROPONIN QUANT: CPT | Performed by: INTERNAL MEDICINE

## 2017-01-01 PROCEDURE — 85732 THROMBOPLASTIN TIME PARTIAL: CPT | Performed by: INTERNAL MEDICINE

## 2017-01-01 PROCEDURE — 80061 LIPID PANEL: CPT | Performed by: INTERNAL MEDICINE

## 2017-01-01 PROCEDURE — 25010000002 HEPARIN (PORCINE) PER 1000 UNITS: Performed by: INTERNAL MEDICINE

## 2017-01-01 PROCEDURE — 93005 ELECTROCARDIOGRAM TRACING: CPT | Performed by: NURSE PRACTITIONER

## 2017-01-01 PROCEDURE — 85705 THROMBOPLASTIN INHIBITION: CPT | Performed by: INTERNAL MEDICINE

## 2017-01-01 PROCEDURE — 0 TECHNETIUM SESTAMIBI: Performed by: INTERNAL MEDICINE

## 2017-01-01 PROCEDURE — 81240 F2 GENE: CPT | Performed by: INTERNAL MEDICINE

## 2017-01-01 PROCEDURE — 99231 SBSQ HOSP IP/OBS SF/LOW 25: CPT | Performed by: INTERNAL MEDICINE

## 2017-01-01 PROCEDURE — 93017 CV STRESS TEST TRACING ONLY: CPT

## 2017-01-01 PROCEDURE — 93451 RIGHT HEART CATH: CPT | Performed by: INTERNAL MEDICINE

## 2017-01-01 PROCEDURE — 25010000002 MIDAZOLAM PER 1 MG: Performed by: INTERNAL MEDICINE

## 2017-01-01 PROCEDURE — A9577 INJ MULTIHANCE: HCPCS | Performed by: INTERNAL MEDICINE

## 2017-01-01 PROCEDURE — 0 GADOBENATE DIMEGLUMINE 529 MG/ML SOLUTION: Performed by: INTERNAL MEDICINE

## 2017-01-01 PROCEDURE — 99243 OFF/OP CNSLTJ NEW/EST LOW 30: CPT | Performed by: INTERNAL MEDICINE

## 2017-01-01 PROCEDURE — 80053 COMPREHEN METABOLIC PANEL: CPT | Performed by: EMERGENCY MEDICINE

## 2017-01-01 PROCEDURE — 99283 EMERGENCY DEPT VISIT LOW MDM: CPT

## 2017-01-01 PROCEDURE — 99223 1ST HOSP IP/OBS HIGH 75: CPT | Performed by: INTERNAL MEDICINE

## 2017-01-01 PROCEDURE — 85025 COMPLETE CBC W/AUTO DIFF WBC: CPT | Performed by: NURSE PRACTITIONER

## 2017-01-01 PROCEDURE — 99204 OFFICE O/P NEW MOD 45 MIN: CPT | Performed by: RADIOLOGY

## 2017-01-01 PROCEDURE — B31T1ZZ FLUOROSCOPY OF LEFT PULMONARY ARTERY USING LOW OSMOLAR CONTRAST: ICD-10-PCS | Performed by: INTERNAL MEDICINE

## 2017-01-01 PROCEDURE — B31S1ZZ FLUOROSCOPY OF RIGHT PULMONARY ARTERY USING LOW OSMOLAR CONTRAST: ICD-10-PCS | Performed by: INTERNAL MEDICINE

## 2017-01-01 PROCEDURE — 93971 EXTREMITY STUDY: CPT

## 2017-01-01 PROCEDURE — 85613 RUSSELL VIPER VENOM DILUTED: CPT | Performed by: INTERNAL MEDICINE

## 2017-01-01 PROCEDURE — B2141ZZ FLUOROSCOPY OF RIGHT HEART USING LOW OSMOLAR CONTRAST: ICD-10-PCS | Performed by: INTERNAL MEDICINE

## 2017-01-01 PROCEDURE — C1769 GUIDE WIRE: HCPCS | Performed by: INTERNAL MEDICINE

## 2017-01-01 PROCEDURE — 70450 CT HEAD/BRAIN W/O DYE: CPT

## 2017-01-01 PROCEDURE — 0 IOPAMIDOL PER 1 ML: Performed by: EMERGENCY MEDICINE

## 2017-01-01 PROCEDURE — 85610 PROTHROMBIN TIME: CPT | Performed by: NURSE PRACTITIONER

## 2017-01-01 PROCEDURE — 70549 MR ANGIOGRAPH NECK W/O&W/DYE: CPT

## 2017-01-01 PROCEDURE — 85025 COMPLETE CBC W/AUTO DIFF WBC: CPT | Performed by: EMERGENCY MEDICINE

## 2017-01-01 PROCEDURE — 99238 HOSP IP/OBS DSCHRG MGMT 30/<: CPT | Performed by: INTERNAL MEDICINE

## 2017-01-01 PROCEDURE — C1893 INTRO/SHEATH, FIXED,NON-PEEL: HCPCS | Performed by: INTERNAL MEDICINE

## 2017-01-01 PROCEDURE — 99214 OFFICE O/P EST MOD 30 MIN: CPT | Performed by: INTERNAL MEDICINE

## 2017-01-01 PROCEDURE — 85610 PROTHROMBIN TIME: CPT | Performed by: INTERNAL MEDICINE

## 2017-01-01 PROCEDURE — 70553 MRI BRAIN STEM W/O & W/DYE: CPT

## 2017-01-01 PROCEDURE — 36415 COLL VENOUS BLD VENIPUNCTURE: CPT

## 2017-01-01 PROCEDURE — 25010000002 HYDRALAZINE PER 20 MG: Performed by: NURSE PRACTITIONER

## 2017-01-01 PROCEDURE — 0 IOPAMIDOL 61 % SOLUTION: Performed by: INTERNAL MEDICINE

## 2017-01-01 PROCEDURE — 84484 ASSAY OF TROPONIN QUANT: CPT | Performed by: NURSE PRACTITIONER

## 2017-01-01 PROCEDURE — 76856 US EXAM PELVIC COMPLETE: CPT

## 2017-01-01 PROCEDURE — 70544 MR ANGIOGRAPHY HEAD W/O DYE: CPT

## 2017-01-01 PROCEDURE — 94799 UNLISTED PULMONARY SVC/PX: CPT

## 2017-01-01 PROCEDURE — 25010000002 CHLOROPROCAINE PER 30 ML: Performed by: INTERNAL MEDICINE

## 2017-01-01 PROCEDURE — 82565 ASSAY OF CREATININE: CPT

## 2017-01-01 PROCEDURE — 93018 CV STRESS TEST I&R ONLY: CPT | Performed by: INTERNAL MEDICINE

## 2017-01-01 RX ORDER — ONDANSETRON 4 MG/1
4 TABLET, FILM COATED ORAL EVERY 6 HOURS PRN
Status: DISCONTINUED | OUTPATIENT
Start: 2017-01-01 | End: 2017-01-01 | Stop reason: HOSPADM

## 2017-01-01 RX ORDER — NITROGLYCERIN 0.4 MG/1
0.4 TABLET SUBLINGUAL
Status: DISCONTINUED | OUTPATIENT
Start: 2017-01-01 | End: 2017-01-01 | Stop reason: HOSPADM

## 2017-01-01 RX ORDER — CIPROFLOXACIN 500 MG/1
500 TABLET, FILM COATED ORAL 2 TIMES DAILY
COMMUNITY
End: 2017-01-01

## 2017-01-01 RX ORDER — SODIUM CHLORIDE 9 MG/ML
INJECTION, SOLUTION INTRAVENOUS CONTINUOUS PRN
Status: DISCONTINUED | OUTPATIENT
Start: 2017-01-01 | End: 2017-01-01 | Stop reason: HOSPADM

## 2017-01-01 RX ORDER — HYDRALAZINE HYDROCHLORIDE 20 MG/ML
10 INJECTION INTRAMUSCULAR; INTRAVENOUS EVERY 6 HOURS PRN
Status: DISCONTINUED | OUTPATIENT
Start: 2017-01-01 | End: 2017-01-01 | Stop reason: HOSPADM

## 2017-01-01 RX ORDER — HEPARIN SODIUM 5000 [USP'U]/ML
80 INJECTION, SOLUTION INTRAVENOUS; SUBCUTANEOUS ONCE
Status: DISCONTINUED | OUTPATIENT
Start: 2017-01-01 | End: 2017-01-01 | Stop reason: SDUPTHER

## 2017-01-01 RX ORDER — B-COMPLEX WITH VITAMIN C
100 TABLET ORAL DAILY
COMMUNITY

## 2017-01-01 RX ORDER — MELATONIN
1000 DAILY
COMMUNITY

## 2017-01-01 RX ORDER — ATORVASTATIN CALCIUM 80 MG/1
80 TABLET, FILM COATED ORAL NIGHTLY
Status: DISCONTINUED | OUTPATIENT
Start: 2017-01-01 | End: 2017-01-01 | Stop reason: CLARIF

## 2017-01-01 RX ORDER — SODIUM CHLORIDE 0.9 % (FLUSH) 0.9 %
1-10 SYRINGE (ML) INJECTION AS NEEDED
Status: DISCONTINUED | OUTPATIENT
Start: 2017-01-01 | End: 2017-01-01 | Stop reason: HOSPADM

## 2017-01-01 RX ORDER — ASPIRIN 325 MG
325 TABLET ORAL DAILY
Status: DISCONTINUED | OUTPATIENT
Start: 2017-01-01 | End: 2017-01-01 | Stop reason: HOSPADM

## 2017-01-01 RX ORDER — ROSUVASTATIN CALCIUM 40 MG/1
40 TABLET, COATED ORAL NIGHTLY
Status: DISCONTINUED | OUTPATIENT
Start: 2017-01-01 | End: 2017-01-01

## 2017-01-01 RX ORDER — ASPIRIN 300 MG/1
300 SUPPOSITORY RECTAL DAILY
Status: DISCONTINUED | OUTPATIENT
Start: 2017-01-01 | End: 2017-01-01 | Stop reason: HOSPADM

## 2017-01-01 RX ORDER — MAGNESIUM HYDROXIDE/ALUMINUM HYDROXICE/SIMETHICONE 120; 1200; 1200 MG/30ML; MG/30ML; MG/30ML
30 SUSPENSION ORAL EVERY 6 HOURS PRN
Status: DISCONTINUED | OUTPATIENT
Start: 2017-01-01 | End: 2017-01-01 | Stop reason: HOSPADM

## 2017-01-01 RX ORDER — ACETAMINOPHEN 325 MG/1
650 TABLET ORAL EVERY 4 HOURS PRN
Status: DISCONTINUED | OUTPATIENT
Start: 2017-01-01 | End: 2017-01-01 | Stop reason: HOSPADM

## 2017-01-01 RX ORDER — LEVOFLOXACIN 750 MG/1
750 TABLET ORAL DAILY
Status: DISCONTINUED | OUTPATIENT
Start: 2017-01-01 | End: 2017-01-01 | Stop reason: HOSPADM

## 2017-01-01 RX ORDER — LOSARTAN POTASSIUM 50 MG/1
50 TABLET ORAL DAILY
Qty: 30 TABLET | Refills: 0 | Status: SHIPPED | OUTPATIENT
Start: 2017-01-01

## 2017-01-01 RX ORDER — HEPARIN SODIUM 5000 [USP'U]/ML
40-80 INJECTION, SOLUTION INTRAVENOUS; SUBCUTANEOUS EVERY 6 HOURS PRN
Status: DISCONTINUED | OUTPATIENT
Start: 2017-01-01 | End: 2017-01-01 | Stop reason: HOSPADM

## 2017-01-01 RX ORDER — ROSUVASTATIN CALCIUM 10 MG/1
10 TABLET, COATED ORAL NIGHTLY
Status: DISCONTINUED | OUTPATIENT
Start: 2017-01-01 | End: 2017-01-01 | Stop reason: HOSPADM

## 2017-01-01 RX ORDER — ACETAMINOPHEN 325 MG/1
650 TABLET ORAL EVERY 6 HOURS PRN
Status: DISCONTINUED | OUTPATIENT
Start: 2017-01-01 | End: 2017-01-01 | Stop reason: HOSPADM

## 2017-01-01 RX ORDER — ONDANSETRON 2 MG/ML
4 INJECTION INTRAMUSCULAR; INTRAVENOUS EVERY 6 HOURS PRN
Status: DISCONTINUED | OUTPATIENT
Start: 2017-01-01 | End: 2017-01-01 | Stop reason: HOSPADM

## 2017-01-01 RX ORDER — ONDANSETRON 4 MG/1
4 TABLET, ORALLY DISINTEGRATING ORAL EVERY 6 HOURS PRN
Status: DISCONTINUED | OUTPATIENT
Start: 2017-01-01 | End: 2017-01-01 | Stop reason: HOSPADM

## 2017-01-01 RX ORDER — SODIUM CHLORIDE 0.9 % (FLUSH) 0.9 %
10 SYRINGE (ML) INJECTION AS NEEDED
Status: DISCONTINUED | OUTPATIENT
Start: 2017-01-01 | End: 2017-01-01 | Stop reason: HOSPADM

## 2017-01-01 RX ORDER — METRONIDAZOLE 500 MG/1
500 TABLET ORAL 3 TIMES DAILY
COMMUNITY

## 2017-01-01 RX ORDER — HEPARIN SODIUM 5000 [USP'U]/ML
40-80 INJECTION, SOLUTION INTRAVENOUS; SUBCUTANEOUS EVERY 6 HOURS PRN
Status: DISCONTINUED | OUTPATIENT
Start: 2017-01-01 | End: 2017-01-01 | Stop reason: SDUPTHER

## 2017-01-01 RX ORDER — FENTANYL CITRATE 50 UG/ML
INJECTION, SOLUTION INTRAMUSCULAR; INTRAVENOUS AS NEEDED
Status: DISCONTINUED | OUTPATIENT
Start: 2017-01-01 | End: 2017-01-01 | Stop reason: HOSPADM

## 2017-01-01 RX ORDER — HEPARIN SODIUM 5000 [USP'U]/ML
80 INJECTION, SOLUTION INTRAVENOUS; SUBCUTANEOUS ONCE
Status: COMPLETED | OUTPATIENT
Start: 2017-01-01 | End: 2017-01-01

## 2017-01-01 RX ORDER — CEFDINIR 300 MG/1
300 CAPSULE ORAL 2 TIMES DAILY
COMMUNITY

## 2017-01-01 RX ORDER — SODIUM CHLORIDE 9 MG/ML
75 INJECTION, SOLUTION INTRAVENOUS CONTINUOUS
Status: DISCONTINUED | OUTPATIENT
Start: 2017-01-01 | End: 2017-01-01 | Stop reason: HOSPADM

## 2017-01-01 RX ORDER — MIDAZOLAM HYDROCHLORIDE 1 MG/ML
INJECTION INTRAMUSCULAR; INTRAVENOUS AS NEEDED
Status: DISCONTINUED | OUTPATIENT
Start: 2017-01-01 | End: 2017-01-01 | Stop reason: HOSPADM

## 2017-01-01 RX ORDER — METRONIDAZOLE 500 MG/1
500 TABLET ORAL 3 TIMES DAILY
Status: DISCONTINUED | OUTPATIENT
Start: 2017-01-01 | End: 2017-01-01 | Stop reason: HOSPADM

## 2017-01-01 RX ADMIN — SODIUM CHLORIDE 1000 ML: 9 INJECTION, SOLUTION INTRAVENOUS at 21:35

## 2017-01-01 RX ADMIN — ASPIRIN 325 MG: 325 TABLET ORAL at 11:55

## 2017-01-01 RX ADMIN — HYDRALAZINE HYDROCHLORIDE 10 MG: 20 INJECTION INTRAMUSCULAR; INTRAVENOUS at 17:35

## 2017-01-01 RX ADMIN — ROSUVASTATIN CALCIUM 10 MG: 10 TABLET, FILM COATED ORAL at 19:53

## 2017-01-01 RX ADMIN — HEPARIN SODIUM 7500 UNITS: 5000 INJECTION, SOLUTION INTRAVENOUS; SUBCUTANEOUS at 14:57

## 2017-01-01 RX ADMIN — IOPAMIDOL 85 ML: 612 INJECTION, SOLUTION INTRAVENOUS at 15:45

## 2017-01-01 RX ADMIN — GADOBENATE DIMEGLUMINE 19 ML: 529 INJECTION, SOLUTION INTRAVENOUS at 08:37

## 2017-01-01 RX ADMIN — IOPAMIDOL 90 ML: 755 INJECTION, SOLUTION INTRAVENOUS at 14:12

## 2017-01-01 RX ADMIN — ACETAMINOPHEN 650 MG: 325 TABLET ORAL at 00:19

## 2017-01-01 RX ADMIN — LEVOFLOXACIN 750 MG: 750 TABLET, FILM COATED ORAL at 11:55

## 2017-01-01 RX ADMIN — HEPARIN SODIUM 14.7 UNITS/KG/HR: 10000 INJECTION, SOLUTION INTRAVENOUS at 08:54

## 2017-01-01 RX ADMIN — HEPARIN SODIUM 15.9 UNITS/KG/HR: 10000 INJECTION, SOLUTION INTRAVENOUS at 15:01

## 2017-01-01 RX ADMIN — APIXABAN 10 MG: 2.5 TABLET, FILM COATED ORAL at 11:08

## 2017-01-01 RX ADMIN — SODIUM CHLORIDE 75 ML/HR: 9 INJECTION, SOLUTION INTRAVENOUS at 12:28

## 2017-01-01 RX ADMIN — ASPIRIN 325 MG: 325 TABLET ORAL at 09:31

## 2017-01-01 RX ADMIN — SODIUM CHLORIDE 75 ML/HR: 9 INJECTION, SOLUTION INTRAVENOUS at 16:10

## 2017-01-01 RX ADMIN — LEVOFLOXACIN 750 MG: 750 TABLET, FILM COATED ORAL at 09:31

## 2017-01-01 RX ADMIN — Medication 1 DOSE: at 09:25

## 2017-01-01 RX ADMIN — SODIUM CHLORIDE 75 ML/HR: 9 INJECTION, SOLUTION INTRAVENOUS at 23:15

## 2017-01-01 RX ADMIN — METRONIDAZOLE 500 MG: 500 TABLET, FILM COATED ORAL at 09:31

## 2017-01-01 RX ADMIN — Medication 1 DOSE: at 06:25

## 2017-01-01 RX ADMIN — SODIUM CHLORIDE 75 ML/HR: 9 INJECTION, SOLUTION INTRAVENOUS at 05:47

## 2017-01-01 RX ADMIN — METRONIDAZOLE 500 MG: 500 TABLET, FILM COATED ORAL at 16:10

## 2017-01-01 RX ADMIN — METRONIDAZOLE 500 MG: 500 TABLET, FILM COATED ORAL at 11:55

## 2017-01-01 RX ADMIN — SODIUM CHLORIDE 75 ML/HR: 9 INJECTION, SOLUTION INTRAVENOUS at 06:16

## 2017-01-01 RX ADMIN — METRONIDAZOLE 500 MG: 500 TABLET, FILM COATED ORAL at 23:14

## 2017-05-23 PROBLEM — R55 NEAR SYNCOPE: Status: ACTIVE | Noted: 2017-01-01

## 2017-05-24 PROBLEM — K57.92 DIVERTICULITIS: Status: ACTIVE | Noted: 2017-01-01

## 2017-05-24 PROBLEM — R07.9 CHEST PAIN: Status: ACTIVE | Noted: 2017-01-01

## 2017-05-24 PROBLEM — R68.84 JAW PAIN: Status: ACTIVE | Noted: 2017-01-01

## 2017-05-24 PROBLEM — R47.01 EXPRESSIVE APHASIA: Status: ACTIVE | Noted: 2017-01-01

## 2017-05-25 PROBLEM — I10 ESSENTIAL HYPERTENSION: Status: ACTIVE | Noted: 2017-01-01

## 2017-12-17 PROBLEM — I26.99 BILATERAL PULMONARY EMBOLISM (HCC): Status: ACTIVE | Noted: 2017-01-01

## 2017-12-17 NOTE — H&P
Date of Hospital Visit: [unfilled]  Encounter Provider: Ivan Giles MD  Place of Service: [facility] CARDIOLOGY  Patient Name: Carlotta Rangel  :1955    Chief complaint:  Intermediate-high risk bilateral pulmonary embolism      History of Present Illness:    The patient is a 62 year old woman who is generally healthy.  She has diverticulitis.  She was evaluated by Dr. Garcia for chest pain earlier this year.  Cardiac testing was unremarkable at that time.    She now presents with 5-6 days of right calf pain.  She had no dyspnea or chest pain until yesterday.  She found it hard to do laundry.  She had a HR of 150 associated with dyspnea and CP.    She came in for ER evaluation today due to worsened leg pain.    US of the leg demonstrated femoral to infrapopliteal right LE DVT.  CT scan demonstrated bilateral PE with increased RV/LV ratio.  BNP and troponin are abnormal.    Currently she is asymptomatic at rest but very symptomatic with minimal activity.      Past Medical History:   Diagnosis Date   • Disease of thyroid gland    • Diverticulitis    • Essential hypertension 2017         Past Surgical History:   Procedure Laterality Date   • CHOLECYSTECTOMY     • CYST REMOVAL      left breast   • TONSILLECTOMY           Current Meds  No current facility-administered medications on file prior to encounter.      Current Outpatient Prescriptions on File Prior to Encounter   Medication Sig Dispense Refill   • aspirin 81 MG tablet Take 1 tablet by mouth Daily.     • MULTIPLE VITAMINS-MINERALS PO Take 1 tablet/day by mouth Daily.     • Cholecalciferol 1000 UNITS chewable tablet Chew 600 Units.     • ciprofloxacin (CIPRO) 500 MG tablet Take 500 mg by mouth 2 (Two) Times a Day.     • losartan (COZAAR) 50 MG tablet Take 1 tablet by mouth Daily. 30 tablet 0   • metroNIDAZOLE (FLAGYL) 500 MG tablet Take 500 mg by mouth 3 (Three) Times a Day.           Social History     Social History   •  "Marital status:      Spouse name: N/A   • Number of children: N/A   • Years of education: N/A     Occupational History   • Not on file.     Social History Main Topics   • Smoking status: Never Smoker   • Smokeless tobacco: Never Used   • Alcohol use No   • Drug use: No   • Sexual activity: Defer     Other Topics Concern   • Not on file     Social History Narrative     Family History   Problem Relation Age of Onset   • Stroke Mother    '    REVIEW OF SYSTEMS:   12 point ROS was performed and is negative except as outlined in HPI     REVIEW OF SYSTEMS:   CONSTITUTIONAL: No weight loss, fever, chills, weakness or fatigue.   HEENT: Eyes: No visual loss, blurred vision, double vision or yellow sclerae. Ears, Nose, Throat: No hearing loss, sneezing, congestion, runny nose or sore throat.   SKIN: No rash or itching.     RESPIRATORY: No shortness of breath, hemoptysis, cough or sputum.   GASTROINTESTINAL: No anorexia, nausea, vomiting or diarrhea. No abdominal pain, bright red blood per rectum or melena.  GENITOURINARY: No burning on urination, hematuria or increased frequency.  NEUROLOGICAL: No headache, dizziness, syncope, paralysis, ataxia, numbness or tingling in the extremities. No change in bowel or bladder control.   MUSCULOSKELETAL: No muscle, back pain, joint pain or stiffness.   HEMATOLOGIC: No anemia, bleeding or bruising.   LYMPHATICS: No enlarged nodes. No history of splenectomy.   PSYCHIATRIC: No history of depression, anxiety, hallucinations.   ENDOCRINOLOGIC: No reports of sweating, cold or heat intolerance. No polyuria or polydipsia.       Objective:     Vitals:    12/17/17 1148 12/17/17 1245   BP: (!) 181/113 159/92   Patient Position:  Lying   Pulse: 109 86   Resp: 16 16   Temp: 97.3 °F (36.3 °C)    TempSrc: Tympanic    SpO2: 98% 96%   Weight: 94.3 kg (208 lb)    Height: 165.1 cm (65\")      Body mass index is 34.61 kg/(m^2).  Flowsheet Rows         First Filed Value    Admission Height  165.1 cm " "(65\") Documented at 12/17/2017 1148    Admission Weight  94.3 kg (208 lb) Documented at 12/17/2017 1148          General Appearance:    Alert, cooperative, in no acute distress   Head:    Normocephalic, without obvious abnormality, atraumatic   Eyes:            Lids and lashes normal, conjunctivae and sclerae normal, no   icterus, no pallor, corneas clear, PERRLA   Ears:    Ears appear intact with no abnormalities noted   Throat:   No oral lesions, no thrush, oral mucosa moist   Neck:   No adenopathy, supple, trachea midline, no thyromegaly, no   carotid bruit, no JVD   Back:     No kyphosis present, no scoliosis present, no skin lesions, erythema or scars, no tenderness to percussion or palpation, range of motion normal   Lungs:     Clear to auscultation,respirations regular, even and unlabored    Heart:    Regular rhythm and normal rate, normal S1 and S2, no murmur, no gallop, no rub, no click   Chest Wall:    No abnormalities observed   Abdomen:     Normal bowel sounds, no masses, no organomegaly, soft        non-tender, non-distended, no guarding, no rebound  tenderness   Extremities:   Knot behind right calf   Pulses:   Pulses palpable and equal bilaterally. Normal radial, carotid, femoral, dorsalis pedis and posterior tibial pulses bilaterally. Normal abdominal aorta   Skin:   No bleeding, bruising or rash   Lymph nodes:   No palpable adenopathy   Psychiatric:   Alert and oriented x 3, normal mood and affect.   Lab Review:      Results from last 7 days  Lab Units 12/17/17  1244   SODIUM mmol/L 140   POTASSIUM mmol/L 4.2   CHLORIDE mmol/L 104   CO2 mmol/L 25.1   BUN mg/dL 15   CREATININE mg/dL 0.96   CALCIUM mg/dL 9.5   BILIRUBIN mg/dL 0.4   ALK PHOS U/L 91   ALT (SGPT) U/L 19   AST (SGOT) U/L 26   GLUCOSE mg/dL 115*       Results from last 7 days  Lab Units 12/17/17  1244   TROPONIN T ng/mL 0.033*         Results from last 7 days  Lab Units 12/17/17  1244   WBC 10*3/mm3 8.49   HEMOGLOBIN g/dL 14.8   HEMATOCRIT " % 45.3   PLATELETS 10*3/mm3 144       Results from last 7 days  Lab Units 12/17/17  1243   INR  1.06   APTT seconds 29.4         I personally viewed and interpreted the patient's EKG/Telemetry data    Assessment:    Acute RLE DVT (< 1 week duration)  Acute intermediate-high risk bilateral PE.  I will start IV heparin (PE protocol).  Check echocardiogram.  If no clot in transit, plan clot extraction/lysis in am.    Consult hematology regarding possible hypercoagulability.   Seems like DVT was unprovoked.    Ivan Giles MD  12/17/17

## 2017-12-17 NOTE — ED PROVIDER NOTES
EMERGENCY DEPARTMENT ENCOUNTER    CHIEF COMPLAINT  Chief Complaint: lower extremity pain  History given by: patient, family  History limited by: N/A  Room Number: 11/11  PMD: Olivia Wheeler MD      HPI:  Pt is a 62 y.o. female who presents with lower extremity pain. She states that for the last week, she has had intermittent right calf pain and RLE swelling. The pain worsened today, feels like muscle spasms, and is exacerbated by RLE movement. Yesterday, she developed RIGGS, chest tightness, and rapid palpitations. While the chest tightness and rapid palpitations have resolved today, she still has RIGGS. She denies recent injury or trauma, sensory loss, motor loss, nausea, vomiting, sweating, abd pain, N/V/D, fevers, chills, recent travel, recent surgery, and personal hx/family hx of blood clots. 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 She has worn compression stockings without significant sx relief. Past Medical History of HTN and diverticulitis.     Duration: started about 1 week ago  Timing: intermittent  Location: right calf  Radiation: none  Quality: muscle spasms  Intensity/Severity: moderate  Progression: worsening  Associated Symptoms: RLE swelling, RIGGS, chest tightness (resolved), rapid palpitations (resolved)  Aggravating Factors: RLE movement  Alleviating Factors: none  Previous Episodes: none  Treatment before arrival: Pt states that she has worn compression stockings without significant sx relief.     PAST MEDICAL HISTORY  Active Ambulatory Problems     Diagnosis Date Noted   • Near syncope 05/23/2017   • Expressive aphasia 05/24/2017   • Diverticulitis 05/24/2017   • Chest pain 05/24/2017   • Jaw pain 05/24/2017   • Essential hypertension 05/25/2017     Resolved Ambulatory Problems     Diagnosis Date Noted   • No Resolved Ambulatory Problems     Past Medical History:   Diagnosis Date   • Disease of thyroid gland    • Diverticulitis    • Essential hypertension 5/25/2017        PAST SURGICAL HISTORY  Past Surgical History:   Procedure Laterality Date   • CHOLECYSTECTOMY     • CYST REMOVAL      left breast   • TONSILLECTOMY         FAMILY HISTORY  Family History   Problem Relation Age of Onset   • Stroke Mother        SOCIAL HISTORY  Social History     Social History   • Marital status:      Spouse name: N/A   • Number of children: N/A   • Years of education: N/A     Occupational History   • Not on file.     Social History Main Topics   • Smoking status: Never Smoker   • Smokeless tobacco: Never Used   • Alcohol use No   • Drug use: No   • Sexual activity: Defer     Other Topics Concern   • Not on file     Social History Narrative         ALLERGIES  Other; Ciprofloxacin; Codeine; and Sulfa antibiotics    REVIEW OF SYSTEMS  Review of Systems   Constitutional: Negative for chills and fever.   HENT: Negative for sore throat.    Respiratory: Positive for chest tightness (resolved) and shortness of breath (RIGGS). Negative for cough.    Cardiovascular: Positive for chest pain (chest tightness, resolved), palpitations (rapid palpitations, resolved) and leg swelling (RLE swelling).   Gastrointestinal: Negative for abdominal pain, diarrhea, nausea and vomiting.   Genitourinary: Negative for dysuria.   Musculoskeletal: Negative for back pain.        Right calf pain   Skin: Negative for rash.   Neurological: Negative for dizziness, weakness and numbness.   Psychiatric/Behavioral: The patient is not nervous/anxious.        PHYSICAL EXAM  ED Triage Vitals   Temp Heart Rate Resp BP SpO2   12/17/17 1148 12/17/17 1148 12/17/17 1148 12/17/17 1148 12/17/17 1148   97.3 °F (36.3 °C) 109 16 181/113 98 % WNL       Physical Exam   Constitutional: She is oriented to person, place, and time and well-developed, well-nourished, and in no distress.   HENT:   Head: Normocephalic.   Mouth/Throat: Mucous membranes are normal.   Eyes: No scleral icterus.   Neck: Normal range of motion.   Cardiovascular: Normal  rate, regular rhythm and normal heart sounds.    Pulses:       Dorsalis pedis pulses are 2+ on the right side, and 2+ on the left side.        Posterior tibial pulses are 2+ on the right side, and 2+ on the left side.   Pulmonary/Chest: Effort normal and breath sounds normal. No respiratory distress. She exhibits no tenderness.   Abdominal: Soft. There is no tenderness.   Musculoskeletal: Normal range of motion.   Right calf tenderness, mild RLE swelling, no swelling or tenderness to LLE, NV intact distally to BLE, no erythema or signs of infection to BLE   Neurological: She is alert and oriented to person, place, and time. She has normal motor skills and normal sensation.   Skin: Skin is warm and dry.   BLE are not cool to touch   Psychiatric: Mood and affect normal.   Nursing note and vitals reviewed.      LAB RESULTS  Recent Results (from the past 24 hour(s))   Protime-INR    Collection Time: 12/17/17 12:43 PM   Result Value Ref Range    Protime 13.4 11.7 - 14.2 Seconds    INR 1.06 0.90 - 1.10   aPTT    Collection Time: 12/17/17 12:43 PM   Result Value Ref Range    PTT 29.4 22.7 - 35.4 seconds   Comprehensive Metabolic Panel    Collection Time: 12/17/17 12:44 PM   Result Value Ref Range    Glucose 115 (H) 65 - 99 mg/dL    BUN 15 8 - 23 mg/dL    Creatinine 0.96 0.57 - 1.00 mg/dL    Sodium 140 136 - 145 mmol/L    Potassium 4.2 3.5 - 5.2 mmol/L    Chloride 104 98 - 107 mmol/L    CO2 25.1 22.0 - 29.0 mmol/L    Calcium 9.5 8.6 - 10.5 mg/dL    Total Protein 7.7 6.0 - 8.5 g/dL    Albumin 4.10 3.50 - 5.20 g/dL    ALT (SGPT) 19 1 - 33 U/L    AST (SGOT) 26 1 - 32 U/L    Alkaline Phosphatase 91 39 - 117 U/L    Total Bilirubin 0.4 0.1 - 1.2 mg/dL    eGFR Non African Amer 59 (L) >60 mL/min/1.73    Globulin 3.6 gm/dL    A/G Ratio 1.1 g/dL    BUN/Creatinine Ratio 15.6 7.0 - 25.0    Anion Gap 10.9 mmol/L   CBC Auto Differential    Collection Time: 12/17/17 12:44 PM   Result Value Ref Range    WBC 8.49 4.50 - 10.70 10*3/mm3     RBC 5.10 3.90 - 5.20 10*6/mm3    Hemoglobin 14.8 11.9 - 15.5 g/dL    Hematocrit 45.3 35.6 - 45.5 %    MCV 88.8 80.5 - 98.2 fL    MCH 29.0 26.9 - 32.0 pg    MCHC 32.7 32.4 - 36.3 g/dL    RDW 13.7 (H) 11.7 - 13.0 %    RDW-SD 44.8 37.0 - 54.0 fl    MPV 9.5 6.0 - 12.0 fL    Platelets 144 140 - 500 10*3/mm3    Neutrophil % 68.0 42.7 - 76.0 %    Lymphocyte % 24.3 19.6 - 45.3 %    Monocyte % 6.5 5.0 - 12.0 %    Eosinophil % 0.8 0.3 - 6.2 %    Basophil % 0.2 0.0 - 1.5 %    Immature Grans % 0.2 0.0 - 0.5 %    Neutrophils, Absolute 5.77 1.90 - 8.10 10*3/mm3    Lymphocytes, Absolute 2.06 0.90 - 4.80 10*3/mm3    Monocytes, Absolute 0.55 0.20 - 1.20 10*3/mm3    Eosinophils, Absolute 0.07 0.00 - 0.70 10*3/mm3    Basophils, Absolute 0.02 0.00 - 0.20 10*3/mm3    Immature Grans, Absolute 0.02 0.00 - 0.03 10*3/mm3    nRBC 0.0 0.0 - 0.0 /100 WBC   Troponin    Collection Time: 12/17/17 12:44 PM   Result Value Ref Range    Troponin T 0.033 (H) 0.000 - 0.030 ng/mL   BNP    Collection Time: 12/17/17 12:44 PM   Result Value Ref Range    proBNP 1788.0 (H) 5.0 - 900.0 pg/mL   Duplex Venous Lower Extremity - Right    Collection Time: 12/17/17  1:31 PM   Result Value Ref Range    Right Proximal Femoral Spont 1     Right Mid Femoral Spont 1     Right Distal Femoral Spont 1     Right Popliteal Spont 1     Right Posterior Tibial Vessel 1     Right Peroneal Vessel 1     Right GastronemiusSoleal Vessel 1     Right Lesser Saph Vessel 1     Right Common Femoral Spont Y     Right Common Femoral Phasic Y     Right Common Femoral Augment Y     Right Common Femoral Competent Y     Right Common Femoral Compress C     Right Saphenofemoral Junction Spont Y     Right Saphenofemoral Junction Phasic Y     Right Saphenofemoral Junction Augment Y     Right Saphenofemoral Junction Competent Y     Right Saphenofemoral Junction Compress C     Right Proximal Femoral Compress N     Right Proximal Femoral Thrombus A     Right Mid Femoral Spont N     Right Mid Femoral  Phasic N     Right Mid Femoral Augment N     Right Mid Femoral Compress N     Right Mid Femoral Thrombus A     Right Distal Femoral Compress N     Right Distal Femoral Thrombus A     Right Popliteal Spont N     Right Popliteal Phasic N     Right Popliteal Augment N     Right Popliteal Compress N     Right Posterior Tibial Compress N     Right Posterior Tibial Thrombus A     Right Peroneal Compress N     Right Peroneal Thrombus A     Right GastronemiusSoleal Compress N     Right GastronemiusSoleal Thrombus A     Right Greater Saph AK Compress C     Right Greater Saph BK Compress C     Right Lesser Saph Compress N     Right Lesser Saph Thrombus A     Right Popliteal Thrombus A     Left Common Femoral Spont Y     Left Common Femoral Phasic Y     Left Common Femoral Augment Y     Left Common Femoral Competent Y     Left Common Femoral Compress C      RLE venous duplex- acute right femoral, popliteal, and calf vein DVT    I ordered the above labs and reviewed the results. Spoke with Melony (vascular tech) regarding RLE venous duplex scan results      RADIOLOGY         CT Angiogram Chest With Contrast (Preliminary result) Result time: 12/17/17 14:52:16     Preliminary result by Interface, Rad Results [place] [facility] (12/17/17 14:52:16)     Narrative:     CT ANGIOGRAPHY OF THE CHEST WITH INTRAVENOUS CONTRAST AND 3D  RECONSTRUCTIONS     HISTORY: Right leg DVT. Shortness of breath.     FINDINGS:  The CT scan was performed as an emergency procedure with CT  angiography protocol using intravenous contrast and 3D reconstructions.  The following findings are present:  1. There are pulmonary emboli in the right main pulmonary artery  extending into the upper and lower lobe branches as well as multiple  pulmonary emboli in branches of both upper and lower lobes. The ratio of  the right ventricle to left ventricle is greater than 1, measuring 5.4  cm compared to 3.2 cm. These findings were called immediately to the  emergency room  physician at the time of dictation.  2. The lungs are well-expanded and clear. There is no mediastinal, hilar  or axillary adenopathy. There is no pericardial effusion.  3. The CT images through the upper liver, spleen, and both adrenal  glands appear normal.     Radiation dose reduction techniques were utilized, including automated  exposure control and exposure modulation based on body size.          I ordered the above noted radiological studies and reviewed the images on the PACS system.  Spoke with Dr. Cuevas (radiologist) regarding CTA chest scan results        EKG    EKG was interpreted by Dr. Caldera. See Dr Caldera' note for EKG interpretation.       MEDICAL RECORD REVIEW  6 months ago, troponin was <0.010.    Stress test report from 5/25/17-  · Myocardial perfusion imaging indicates a normal myocardial perfusion study with no evidence of ischemia.  · Left ventricular ejection fraction is hyperdynamic (Calculated EF > 70%).  · Findings consistent with a normal ECG stress test.  · Severe baseline hypertension.  · Markedly hypertensive response to exercise.      PROGRESS AND CONSULTS  12:34 PM- Ordered RLE venous duplex and blood work for further evaluation and to rule out DVT.   1:36 PM- Reviewed pt's history and workup with Dr. Caldera.  At bedside evaluation, they agree with the plan of care.  1:40 PM- RLE venous duplex shows acute right femoral, popliteal, and calf vein DVT. Pt reports also having sx of RIGGS, chest tightness, and rapid palpitations. Ordered EKG and CTA chest for further evaluation and to rule out pulmonary embolism.   2:25 PM- Rechecked pt. She is resting comfortably and is in no acute distress. Discussed with pt about stable blood work and about RLE venous duplex results (acute right femoral, popliteal, and calf vein DVT). Informed pt that we are awaiting CTA chest results. Discussed pt admission for further care, anticoagulation, and observation. Pt verbalizes understanding and agreement  with plan.   2:38 PM- CTA chest shows pulmonary emboli in the right main pulmonary artery extending into the upper and lower lobe branches as well as multiple pulmonary emboli in branches of both upper and lower lobes; RV to LV ratio is greater than 1. Sent call out to on call interventional cardiologist for admission. Added troponin and BNP to orders for further evaluation.   2:40 PM- Dr Caldera informed pt about CTA chest findings (bilateral pulmonary emboli).   2:43 PM- Dr Caldera discussed case with Dr Giles (cardiologist)  Dr Caldera reviewed history, exam, results and treatments. He also discussed concerns and plan of care. Dr Giles accepts pt to be admitted to telemetry and requests that pt be started on weight based heparin drip and bolus for anticoagulation.   2:45 PM- Ordered heparin drip and bolus for anticoagulation.       ADMISSION    Discussed treatment plan and reason for admission with pt/family and admitting physician.  Pt/family voiced understanding of the plan for admission for further testing/treatment as needed.      DIAGNOSIS  Final diagnoses:   Acute deep vein thrombosis (DVT) of right lower extremity, unspecified vein   Other acute pulmonary embolism with acute cor pulmonale         COURSE & MEDICAL DECISION MAKING  Pertinent Labs and Imaging studies that were ordered and reviewed are noted above.  Results were reviewed/discussed with the patient and they were also made aware of online assess.   Pt also made aware that some labs, such as cultures, will not be resulted during ER visit and follow up with PMD is necessary.     MEDICATIONS GIVEN IN ER  Medications   heparin 79778 units/250 mL (100 units/mL) in 0.45 % NaCl infusion (15.9 Units/kg/hr × 94.3 kg Intravenous New Bag 12/17/17 1501)   heparin (porcine) 5000 UNIT/ML injection 3,800-7,500 Units (not administered)   iopamidol (ISOVUE-370) 76 % injection 100 mL (90 mL Intravenous Given 12/17/17 1412)   heparin (porcine) 5000 UNIT/ML injection 7,500  "Units (7,500 Units Intravenous Given 12/17/17 8647)       /92 (Patient Position: Lying)  Pulse 86  Temp 97.3 °F (36.3 °C) (Tympanic)   Resp 16  Ht 165.1 cm (65\")  Wt 94.3 kg (208 lb)  SpO2 96%  BMI 34.61 kg/m2      I personally reviewed the past medical history, past surgical history, social history, family history, current medications and allergies as they appear in this chart.  The scribe's note accurately reflects the work and decisions made by me.     Documentation assistance provided by tete Benites for JOHANNA Royal on 12/17/2017 at 3:01 PM. Information recorded by the scribe was done at my direction and has been verified and validated by me.       Lizette Benites  12/17/17 1543       RAJEEV Braga  12/19/17 3997    "

## 2017-12-17 NOTE — PROGRESS NOTES
Clinical Pharmacy Services: Medication History    Carlotta Rangel is a 62 y.o. female presenting to [facility] for Bilateral pulmonary embolism [I26.99]    She  has a past medical history of Disease of thyroid gland; Diverticulitis; and Essential hypertension (5/25/2017).    Allergies as of 12/17/2017 - Clifford as Reviewed 12/17/2017   Allergen Reaction Noted   • Other GI Intolerance 05/22/2017   • Ciprofloxacin  12/17/2017   • Codeine GI Intolerance 05/22/2017   • Sulfa antibiotics GI Intolerance 05/22/2017       Medication information was obtained from: Patients reported medication list as well as patients pharmacy     Pharmacy and Phone Number: [facility] [phone]    Prior to Admission Medications     Prescriptions Last Dose Informant Patient Reported? Taking?    aspirin 81 MG tablet  Self No Yes    Take 1 tablet by mouth Daily.    cholecalciferol (VITAMIN D3) 1000 units tablet  Self Yes Yes    Take 1,000 Units by mouth Daily.    MULTIPLE VITAMINS-MINERALS PO  Self Yes Yes    Take 1 tablet/day by mouth Daily.    Zinc 100 MG tablet  Self Yes Yes    Take 100 mg by mouth Daily.    cefdinir (OMNICEF) 300 MG capsule  Pharmacy Yes No    Take 300 mg by mouth 2 (Two) Times a Day.    losartan (COZAAR) 50 MG tablet  Pharmacy No No    Take 1 tablet by mouth Daily.    metroNIDAZOLE (FLAGYL) 500 MG tablet  Pharmacy Yes No    Take 500 mg by mouth 3 (Three) Times a Day.            Medication notes: pt is non adherent to losartan, and currently finished a regmine of cefdinir and metronidazole on 12/10     This medication list is complete to the best of my knowledge as of 12/17/2017    Please call if you have any questions.    Nickolas Azevedo  Medication History Technician   [id number]  12/17/2017 3:31 PM

## 2017-12-17 NOTE — PROGRESS NOTES
RLE Venous doppler study complete. Preliminary positive for acute Femoral, popliteal and calf DVT called to [facility]

## 2017-12-17 NOTE — ED PROVIDER NOTES
Pt is a 62 y.o. female who presents c/o worsening RLE pain and dyspnea with minimal exertion x1 week. Pt also c/o chest tightness yesterday and states her HR was up to 140's at rest. Pt denies any known injury or trauma to her leg. Pt denies hx of DVT or heart/lung problems.     PE:  Resting comfortably, no distress  Vitals stable, A&O x3  Heart RRR, lungs CTAB  Abdomen soft, non tender, non distended  Tenderness and swelling to the RLE, NV intact    EKG    EKG time: 14:33  Rhythm/Rate: NSR, rate 89  No Acute Ischemia  Non-Specific ST-T changes    Unchanged compared to prior in 2017    Interpreted Contemporaneously by me.  Independently viewed by me      Duplex venous RLE shows DVT extending from the femoral to calf vein and CTA chest shows bilateral PE with RV:LV greater than 1. Plan to admit and start anticoagulation therapy.    2:43 PM  Discussed Pt's case with Dr. Giles (cardiologist) who agrees to admit to a telemetry bed and would like the Pt be started on weight-based heparin.     I supervised care provided by the midlevel provider RAJEEV Royal.    We have discussed this patient's history, physical exam, and treatment plan.   I have reviewed the note and personally saw and examined the patient and agree with the plan of care.    Documentation assistance provided by tete Sainz for Dr. Caldera.  Information recorded by the scribe was done at my direction and has been verified and validated by me.              Wendy Sainz  12/17/17 2683       Terell Caldera MD  12/17/17 [id number]

## 2017-12-17 NOTE — PLAN OF CARE
"Problem: Patient Care Overview (Adult)  Goal: Plan of Care Review  Outcome: Ongoing (interventions implemented as appropriate)    12/17/17 8894   Coping/Psychosocial Response Interventions   Plan Of Care Reviewed With patient   Patient Care Overview   Progress progress toward functional goals as expected   Outcome Evaluation   Outcome Summary/Follow up Plan BP running on the high side, received PRN hydralazine x1, asymptomatic, no c/o CP, just \"annoying\" cough after deep breathing. mild swelling in RLE. On Heparin gtt. NPO after midnight for possible thrombectomy       Goal: Adult Individualization and Mutuality  Outcome: Ongoing (interventions implemented as appropriate)    Problem: VTE, DVT and PE (Adult)  Goal: Signs and Symptoms of Listed Potential Problems Will be Absent or Manageable (VTE, DVT and PE)  Outcome: Ongoing (interventions implemented as appropriate)    Problem: Pain, Acute (Adult)  Goal: Identify Related Risk Factors and Signs and Symptoms  Outcome: Ongoing (interventions implemented as appropriate)  Goal: Acceptable Pain Control/Comfort Level  Outcome: Ongoing (interventions implemented as appropriate)      "

## 2017-12-17 NOTE — ED NOTES
Pt to ED for right posterior lower leg pain since Monday, states pain worse today, also c/o area of swelling distal to right knee. Pt reports shortness of breath with exertion. Pt states she is a  and is sedentary for long periods of time at work       Parul Arevalo RN  12/17/17 2487

## 2017-12-18 NOTE — PLAN OF CARE
Problem: Patient Care Overview (Adult)  Goal: Plan of Care Review  Outcome: Ongoing (interventions implemented as appropriate)    12/18/17 1702   Coping/Psychosocial Response Interventions   Plan Of Care Reviewed With patient   Patient Care Overview   Progress improving   Outcome Evaluation   Outcome Summary/Follow up Plan vss. c/o soa with exertion. thrombectomy with Dr. Giles this afternoon. heparin gtt continues. continue to monitor       Goal: Adult Individualization and Mutuality  Outcome: Ongoing (interventions implemented as appropriate)  Goal: Discharge Needs Assessment  Outcome: Ongoing (interventions implemented as appropriate)    Problem: VTE, DVT and PE (Adult)  Goal: Signs and Symptoms of Listed Potential Problems Will be Absent or Manageable (VTE, DVT and PE)  Outcome: Ongoing (interventions implemented as appropriate)    12/18/17 1702   VTE, DVT and PE   Problems Assessed (VTE, DVT, PE) all   Problems Present (VTE, DVT, PE) deep vein thrombosis         Problem: Pain, Acute (Adult)  Goal: Identify Related Risk Factors and Signs and Symptoms  Outcome: Ongoing (interventions implemented as appropriate)    12/17/17 1751   Pain, Acute   Related Risk Factors (Acute Pain) disease process   Signs and Symptoms (Acute Pain) verbalization of pain descriptors       Goal: Acceptable Pain Control/Comfort Level  Outcome: Ongoing (interventions implemented as appropriate)    12/18/17 1702   Pain, Acute (Adult)   Acceptable Pain Control/Comfort Level making progress toward outcome

## 2017-12-18 NOTE — PROGRESS NOTES
"    Patient Name: Carlotta Rangel  :1955  62 y.o.      Patient Care Team:  Olivia Wheeler MD as PCP - General (Internal Medicine)    Chief Complaint: Pulmonary emboli    Interval History: less SOB on O2  Echo performed:  Interpretation Summary   · Left ventricular systolic function is hyperdynamic (EF > 70).  · Left ventricular diastolic dysfunction (grade I) consistent with impaired relaxation.  · Right atrial cavity size is mildly dilated.  · Right ventricular cavity is moderately dilated.  · Moderately reduced right ventricular systolic function noted.  · Mild tricuspid valve regurgitation is present.  · Estimated right ventricular systolic pressure from tricuspid regurgitation is moderately elevated (45-55 mmHg).  · Calculated right ventricular systolic pressure from tricuspid regurgitation is 50.3 mmHg.                Objective   Vital Signs  Temp:  [97.3 °F (36.3 °C)-100.4 °F (38 °C)] 98.8 °F (37.1 °C)  Heart Rate:  [] 84  Resp:  [16-20] 16  BP: (142-183)/() 150/94    Intake/Output Summary (Last 24 hours) at 17 1145  Last data filed at 17 0248   Gross per 24 hour   Intake              580 ml   Output              900 ml   Net             -320 ml     Flowsheet Rows         First Filed Value    Admission Height  165.1 cm (65\") Documented at 2017 1148    Admission Weight  94.3 kg (208 lb) Documented at 2017 1148          Physical Exam:   General Appearance:    Alert, cooperative, in no acute distress   Lungs:     Clear to auscultation.  Normal respiratory effort and rate.      Heart:    Regular rhythm and normal rate, normal S1 and S2, no murmurs, gallops or rubs.     Chest Wall:    No abnormalities observed   Abdomen:     Soft, nontender, positive bowel sounds.     Extremities:   no cyanosis, clubbing or edema.  No marked joint deformities.  Adequate musculoskeletal strength.       Results Review:      Results from last 7 days  Lab Units 17  0537   SODIUM mmol/L 139 "   POTASSIUM mmol/L 4.9   CHLORIDE mmol/L 104   CO2 mmol/L 27.3   BUN mg/dL 13   CREATININE mg/dL 0.94   GLUCOSE mg/dL 119*   CALCIUM mg/dL 9.8       Results from last 7 days  Lab Units 12/17/17  1244   TROPONIN T ng/mL 0.033*       Results from last 7 days  Lab Units 12/18/17  0537   WBC 10*3/mm3 9.11   HEMOGLOBIN g/dL 14.6   HEMATOCRIT % 46.9*   PLATELETS 10*3/mm3 134*       Results from last 7 days  Lab Units 12/18/17  0537 12/17/17  2058 12/17/17  1713 12/17/17  1243   INR   --   --  1.14* 1.06   APTT seconds 63.2* 123.2*  --  29.4                       Medication Review:     perflutren (DEFINITY) in 0.9% NS 10 mL syringe 2 mL Intravenous Once          heparin (porcine) 15.9 Units/kg/hr Last Rate: 14.9 Units/kg/hr (12/18/17 0725)       Assessment/Plan   Active Hospital Problems (** Indicates Principal Problem)    Diagnosis Date Noted   • Bilateral pulmonary embolism [I26.99] 12/17/2017      Resolved Hospital Problems    Diagnosis Date Noted Date Resolved   No resolved problems to display.     1.  Bilateral pulmonary emboli-for embolectomy today.     2.  Hematology has been consulted by Dr. Giles for evaluation given absence of clear provocation for her DVT and pulmonary emboli    Arie Garcia III, MD  [facility]  12/18/17  11:45 AM

## 2017-12-18 NOTE — PLAN OF CARE
Problem: Patient Care Overview (Adult)  Goal: Plan of Care Review  Outcome: Ongoing (interventions implemented as appropriate)    12/18/17 0024   Coping/Psychosocial Response Interventions   Plan Of Care Reviewed With patient   Patient Care Overview   Progress improving   Outcome Evaluation   Outcome Summary/Follow up Plan VSS. C/o mild pain in right leg, but refuses any pain meds. Mild edema in RLE. Heparin gtt continues. NPO after midnight for possible thrombectomy in am. Will continue to monitor.        Goal: Adult Individualization and Mutuality  Outcome: Ongoing (interventions implemented as appropriate)  Goal: Discharge Needs Assessment  Outcome: Ongoing (interventions implemented as appropriate)    Problem: VTE, DVT and PE (Adult)  Goal: Signs and Symptoms of Listed Potential Problems Will be Absent or Manageable (VTE, DVT and PE)  Outcome: Ongoing (interventions implemented as appropriate)    Problem: Pain, Acute (Adult)  Goal: Identify Related Risk Factors and Signs and Symptoms  Outcome: Ongoing (interventions implemented as appropriate)  Goal: Acceptable Pain Control/Comfort Level  Outcome: Ongoing (interventions implemented as appropriate)

## 2017-12-18 NOTE — PAYOR COMM NOTE
"Landon Lennon (62 y.o. Female)                                                   REF# [id number]                                    CONTACT=    AMY LUA                                            FAX    [phone]                      [phone]                          INITIAL CLN  ATTACHED           Date of Birth Social Security Number Address Home Phone MRN    [id number]  [id number] [facility] [address] [phone] [id number]    Adventism Marital Status          Jewish        Admission Date Admission Type Admitting Provider Attending Provider Department, Room/Bed    12/17/17 Emergency Ivan Giles MD Tu, Thomas, MD [address], 564/1    Discharge Date Discharge Disposition Discharge Destination                      Attending Provider: Ivan Giles MD     Allergies:  Other, Ciprofloxacin, Codeine, Sulfa Antibiotics    Isolation:  None   Infection:  None   Code Status:  FULL    Ht:  165.1 cm (65\")   Wt:  94.3 kg (208 lb)    Admission Cmt:  None   Principal Problem:  None                Active Insurance as of 12/17/2017     Primary Coverage     Payor Plan Insurance Group Employer/Plan Group    [employer] BLUE CROSS BLUE SHIELD PPO [id number]     Payor Plan Address Payor Plan Phone Number Effective From Effective To    [address] [phone] 1/1/2016     [place] [address]       Subscriber Name Subscriber Birth Date Member ID       LANDON LENNON 1955 [id number]                 Emergency Contacts      (Rel.) Home Phone Work Phone Mobile Phone    Chi Lennon (Spouse) [phone] -- [phone]            Insurance Information                ANTHEM BLUE CROSS/[employer]HEM BLUE CROSS BLUE SHIELD PPO Phone: [phone]    Subscriber: Landon Lennon Subscriber#: [id number]    Group#: [id number] Precert#:              History & Physical      Ivan Giles MD at 12/17/2017  3:29 PM          Date of Hospital Visit: [unfilled]  Encounter " Provider: Ivan Giles MD  Place of Service: [facility] CARDIOLOGY  Patient Name: Carlotta Rangel  :1955    Chief complaint:  Intermediate-high risk bilateral pulmonary embolism      History of Present Illness:    The patient is a 62 year old woman who is generally healthy.  She has diverticulitis.  She was evaluated by Dr. Garcia for chest pain earlier this year.  Cardiac testing was unremarkable at that time.    She now presents with 5-6 days of right calf pain.  She had no dyspnea or chest pain until yesterday.  She found it hard to do laundry.  She had a HR of 150 associated with dyspnea and CP.    She came in for ER evaluation today due to worsened leg pain.    US of the leg demonstrated femoral to infrapopliteal right LE DVT.  CT scan demonstrated bilateral PE with increased RV/LV ratio.  BNP and troponin are abnormal.    Currently she is asymptomatic at rest but very symptomatic with minimal activity.      Past Medical History:   Diagnosis Date   • Disease of thyroid gland    • Diverticulitis    • Essential hypertension 2017         Past Surgical History:   Procedure Laterality Date   • CHOLECYSTECTOMY     • CYST REMOVAL      left breast   • TONSILLECTOMY           Current Meds  No current facility-administered medications on file prior to encounter.      Current Outpatient Prescriptions on File Prior to Encounter   Medication Sig Dispense Refill   • aspirin 81 MG tablet Take 1 tablet by mouth Daily.     • MULTIPLE VITAMINS-MINERALS PO Take 1 tablet/day by mouth Daily.     • Cholecalciferol 1000 UNITS chewable tablet Chew 600 Units.     • ciprofloxacin (CIPRO) 500 MG tablet Take 500 mg by mouth 2 (Two) Times a Day.     • losartan (COZAAR) 50 MG tablet Take 1 tablet by mouth Daily. 30 tablet 0   • metroNIDAZOLE (FLAGYL) 500 MG tablet Take 500 mg by mouth 3 (Three) Times a Day.           Social History     Social History   • Marital status:      Spouse name: N/A   •  "Number of children: N/A   • Years of education: N/A     Occupational History   • Not on file.     Social History Main Topics   • Smoking status: Never Smoker   • Smokeless tobacco: Never Used   • Alcohol use No   • Drug use: No   • Sexual activity: Defer     Other Topics Concern   • Not on file     Social History Narrative     Family History   Problem Relation Age of Onset   • Stroke Mother    '    REVIEW OF SYSTEMS:   12 point ROS was performed and is negative except as outlined in HPI     REVIEW OF SYSTEMS:   CONSTITUTIONAL: No weight loss, fever, chills, weakness or fatigue.   HEENT: Eyes: No visual loss, blurred vision, double vision or yellow sclerae. Ears, Nose, Throat: No hearing loss, sneezing, congestion, runny nose or sore throat.   SKIN: No rash or itching.     RESPIRATORY: No shortness of breath, hemoptysis, cough or sputum.   GASTROINTESTINAL: No anorexia, nausea, vomiting or diarrhea. No abdominal pain, bright red blood per rectum or melena.  GENITOURINARY: No burning on urination, hematuria or increased frequency.  NEUROLOGICAL: No headache, dizziness, syncope, paralysis, ataxia, numbness or tingling in the extremities. No change in bowel or bladder control.   MUSCULOSKELETAL: No muscle, back pain, joint pain or stiffness.   HEMATOLOGIC: No anemia, bleeding or bruising.   LYMPHATICS: No enlarged nodes. No history of splenectomy.   PSYCHIATRIC: No history of depression, anxiety, hallucinations.   ENDOCRINOLOGIC: No reports of sweating, cold or heat intolerance. No polyuria or polydipsia.       Objective:     Vitals:    12/17/17 1148 12/17/17 1245   BP: (!) 181/113 159/92   Patient Position:  Lying   Pulse: 109 86   Resp: 16 16   Temp: 97.3 °F (36.3 °C)    TempSrc: Tympanic    SpO2: 98% 96%   Weight: 94.3 kg (208 lb)    Height: 165.1 cm (65\")      Body mass index is 34.61 kg/(m^2).  Flowsheet Rows         First Filed Value    Admission Height  165.1 cm (65\") Documented at 12/17/2017 1148    Admission " Weight  94.3 kg (208 lb) Documented at 12/17/2017 1148          General Appearance:    Alert, cooperative, in no acute distress   Head:    Normocephalic, without obvious abnormality, atraumatic   Eyes:            Lids and lashes normal, conjunctivae and sclerae normal, no   icterus, no pallor, corneas clear, PERRLA   Ears:    Ears appear intact with no abnormalities noted   Throat:   No oral lesions, no thrush, oral mucosa moist   Neck:   No adenopathy, supple, trachea midline, no thyromegaly, no   carotid bruit, no JVD   Back:     No kyphosis present, no scoliosis present, no skin lesions, erythema or scars, no tenderness to percussion or palpation, range of motion normal   Lungs:     Clear to auscultation,respirations regular, even and unlabored    Heart:    Regular rhythm and normal rate, normal S1 and S2, no murmur, no gallop, no rub, no click   Chest Wall:    No abnormalities observed   Abdomen:     Normal bowel sounds, no masses, no organomegaly, soft        non-tender, non-distended, no guarding, no rebound  tenderness   Extremities:   Knot behind right calf   Pulses:   Pulses palpable and equal bilaterally. Normal radial, carotid, femoral, dorsalis pedis and posterior tibial pulses bilaterally. Normal abdominal aorta   Skin:   No bleeding, bruising or rash   Lymph nodes:   No palpable adenopathy   Psychiatric:   Alert and oriented x 3, normal mood and affect.   Lab Review:      Results from last 7 days  Lab Units 12/17/17  1244   SODIUM mmol/L 140   POTASSIUM mmol/L 4.2   CHLORIDE mmol/L 104   CO2 mmol/L 25.1   BUN mg/dL 15   CREATININE mg/dL 0.96   CALCIUM mg/dL 9.5   BILIRUBIN mg/dL 0.4   ALK PHOS U/L 91   ALT (SGPT) U/L 19   AST (SGOT) U/L 26   GLUCOSE mg/dL 115*       Results from last 7 days  Lab Units 12/17/17  1244   TROPONIN T ng/mL 0.033*         Results from last 7 days  Lab Units 12/17/17  1244   WBC 10*3/mm3 8.49   HEMOGLOBIN g/dL 14.8   HEMATOCRIT % 45.3   PLATELETS 10*3/mm3 144       Results  from last 7 days  Lab Units 12/17/17  1243   INR  1.06   APTT seconds 29.4         I personally viewed and interpreted the patient's EKG/Telemetry data    Assessment:    Acute RLE DVT (< 1 week duration)  Acute intermediate-high risk bilateral PE.  I will start IV heparin (PE protocol).  Check echocardiogram.  If no clot in transit, plan clot extraction/lysis in am.    Consult hematology regarding possible hypercoagulability.   Seems like DVT was unprovoked.    Ivan Giles MD  12/17/17     Electronically signed by Ivan Giles MD at 12/17/2017  3:35 PM           Emergency Department Notes      Parul Arevalo RN at 12/17/2017  1:21 PM          Pt to ED for right posterior lower leg pain since Monday, states pain worse today, also c/o area of swelling distal to right knee. Pt reports shortness of breath with exertion. Pt states she is a  and is sedentary for long periods of time at work       Parul Arevalo RN  12/17/17 1323       Electronically signed by Parul Arevalo RN at 12/17/2017  1:23 PM      Terell Caldera MD at 12/17/2017  2:38 PM          Pt is a 62 y.o. female who presents c/o worsening RLE pain and dyspnea with minimal exertion x1 week. Pt also c/o chest tightness yesterday and states her HR was up to 140's at rest. Pt denies any known injury or trauma to her leg. Pt denies hx of DVT or heart/lung problems.     PE:  Resting comfortably, no distress  Vitals stable, A&O x3  Heart RRR, lungs CTAB  Abdomen soft, non tender, non distended  Tenderness and swelling to the RLE, NV intact    EKG    EKG time: 14:33  Rhythm/Rate: NSR, rate 89  No Acute Ischemia  Non-Specific ST-T changes    Unchanged compared to prior in 2017    Interpreted Contemporaneously by me.  Independently viewed by me      Duplex venous RLE shows DVT extending from the femoral to calf vein and CTA chest shows bilateral PE with RV:LV greater than 1. Plan to admit and start anticoagulation therapy.    2:43 PM  Discussed Pt's case  with Dr. Giles (cardiologist) who agrees to admit to a telemetry bed and would like the Pt be started on weight-based heparin.     I supervised care provided by the midlevel provider RAJEEV Royal.    We have discussed this patient's history, physical exam, and treatment plan.   I have reviewed the note and personally saw and examined the patient and agree with the plan of care.    Documentation assistance provided by tete Sainz for Dr. Caldera.  Information recorded by the scribe was done at my direction and has been verified and validated by me.              Wendy Sainz  12/17/17 1445       Terell Caldera MD  12/17/17 2041       Electronically signed by Terell Caldera MD at 12/17/2017  8:41 PM        Vital Signs (last 24 hours)       12/17 0700  -  12/18 0659 12/18 0700  -  12/18 1205   Most Recent    Temp (°F) 97.3 -  100.4      98.8     98.8 (37.1)    Heart Rate 86 -  109    79 -  84     84    Resp 16 -  20    16 -  20     16    /80 -  (!) 183/100    145/70 -  150/94     150/94    SpO2 (%) 96 -  98      96     96          Hospital Medications (active)       Dose Frequency Start End    acetaminophen (TYLENOL) tablet 650 mg 650 mg Every 4 Hours PRN 12/17/2017     Sig - Route: Take 2 tablets by mouth Every 4 (Four) Hours As Needed for Mild Pain . - Oral    heparin (porcine) 5000 UNIT/ML injection 3,800-7,500 Units 40-80 Units/kg × 94.3 kg Every 6 Hours PRN 12/17/2017     Sig - Route: Infuse 0.76-1.5 mL into a venous catheter Every 6 (Six) Hours As Needed (Per Heparin Nomogram). - Intravenous    heparin (porcine) 5000 UNIT/ML injection 7,500 Units 80 Units/kg × 94.3 kg Once 12/17/2017 12/17/2017    Sig - Route: Infuse 1.5 mL into a venous catheter 1 (One) Time. - Intravenous    heparin 49218 units/250 mL (100 units/mL) in 0.45 % NaCl infusion 15.9 Units/kg/hr × 94.3 kg Titrated 12/17/2017     Sig - Route: Infuse 1,499 Units/hr into a venous catheter Dose Adjusted By Provider As  Needed. - Intravenous    hydrALAZINE (APRESOLINE) injection 10 mg 10 mg Every 6 Hours PRN 12/17/2017     Sig - Route: Infuse 0.5 mL into a venous catheter Every 6 (Six) Hours As Needed for High Blood Pressure. - Intravenous    iopamidol (ISOVUE-370) 76 % injection 100 mL 100 mL Once in Imaging 12/17/2017 12/17/2017    Sig - Route: Infuse 100 mL into a venous catheter Once. - Intravenous    nitroglycerin (NITROSTAT) SL tablet 0.4 mg 0.4 mg Every 5 Minutes PRN 12/17/2017     Sig - Route: Place 1 tablet under the tongue Every 5 (Five) Minutes As Needed for Chest Pain (if systolic BP greater than 100 mm/Hg.). - Sublingual    ondansetron (ZOFRAN) injection 4 mg 4 mg Every 6 Hours PRN 12/17/2017     Sig - Route: Infuse 2 mL into a venous catheter Every 6 (Six) Hours As Needed for Nausea or Vomiting. - Intravenous    perflutren (DEFINITY) 8.476 mg in sodium chloride 0.9 % 10 mL injection 2 mL Once 12/17/2017     Sig - Route: Infuse 2 mL into a venous catheter 1 (One) Time. - Intravenous    sodium chloride 0.9 % flush 1-10 mL 1-10 mL As Needed 12/17/2017     Sig - Route: Infuse 1-10 mL into a venous catheter As Needed for Line Care. - Intravenous    sodium chloride 0.9 % flush 1-10 mL 1-10 mL As Needed 12/17/2017     Sig - Route: Infuse 1-10 mL into a venous catheter As Needed for Line Care. - Intravenous    heparin (porcine) 5000 UNIT/ML injection 3,800-7,500 Units (Discontinued) 40-80 Units/kg × 94.3 kg Every 6 Hours PRN 12/17/2017 12/17/2017    Sig - Route: Infuse 0.76-1.5 mL into a venous catheter Every 6 (Six) Hours As Needed (Per Heparin Nomogram). - Intravenous    Reason for Discontinue: Duplicate order    heparin (porcine) 5000 UNIT/ML injection 7,500 Units (Discontinued) 80 Units/kg × 94.3 kg Once 12/17/2017 12/17/2017    Sig - Route: Infuse 1.5 mL into a venous catheter 1 (One) Time. - Intravenous    Reason for Discontinue: Duplicate order    heparin 00540 units/250 mL (100 units/mL) in 0.45 % NaCl infusion  (Discontinued) 15.9 Units/kg/hr × 94.3 kg Titrated 12/17/2017 12/17/2017    Sig - Route: Infuse 1,499 Units/hr into a venous catheter Dose Adjusted By Provider As Needed. - Intravenous    Reason for Discontinue: Duplicate order            Lab Results (last 24 hours)     Procedure Component Value Units Date/Time    CBC & Differential [[id number]] Collected:  12/17/17 1244    Specimen:  Blood Updated:  12/17/17 1253    Narrative:       The following orders were created for panel order CBC & Differential.  Procedure                               Abnormality         Status                     ---------                               -----------         ------                     CBC Auto Differential[[id number]]        Abnormal            Final result                 Please view results for these tests on the individual orders.    CBC Auto Differential [[id number]]  (Abnormal) Collected:  12/17/17 1244    Specimen:  Blood from Arm, Left Updated:  12/17/17 1253     WBC 8.49 10*3/mm3      RBC 5.10 10*6/mm3      Hemoglobin 14.8 g/dL      Hematocrit 45.3 %      MCV 88.8 fL      MCH 29.0 pg      MCHC 32.7 g/dL      RDW 13.7 (H) %      RDW-SD 44.8 fl      MPV 9.5 fL      Platelets 144 10*3/mm3      Neutrophil % 68.0 %      Lymphocyte % 24.3 %      Monocyte % 6.5 %      Eosinophil % 0.8 %      Basophil % 0.2 %      Immature Grans % 0.2 %      Neutrophils, Absolute 5.77 10*3/mm3      Lymphocytes, Absolute 2.06 10*3/mm3      Monocytes, Absolute 0.55 10*3/mm3      Eosinophils, Absolute 0.07 10*3/mm3      Basophils, Absolute 0.02 10*3/mm3      Immature Grans, Absolute 0.02 10*3/mm3      nRBC 0.0 /100 WBC     Protime-INR [[id number]]  (Normal) Collected:  12/17/17 1243    Specimen:  Blood from Arm, Left Updated:  12/17/17 1304     Protime 13.4 Seconds      INR 1.06    Comprehensive Metabolic Panel [[id number]]  (Abnormal) Collected:  12/17/17 1244    Specimen:  Blood from Arm, Left Updated:  12/17/17 1315     Glucose 115 (H) mg/dL       BUN 15 mg/dL      Creatinine 0.96 mg/dL      Sodium 140 mmol/L      Potassium 4.2 mmol/L      Chloride 104 mmol/L      CO2 25.1 mmol/L      Calcium 9.5 mg/dL      Total Protein 7.7 g/dL      Albumin 4.10 g/dL      ALT (SGPT) 19 U/L      AST (SGOT) 26 U/L      Alkaline Phosphatase 91 U/L      Total Bilirubin 0.4 mg/dL      eGFR Non African Amer 59 (L) mL/min/1.73      Globulin 3.6 gm/dL      A/G Ratio 1.1 g/dL      BUN/Creatinine Ratio 15.6     Anion Gap 10.9 mmol/L     BNP [[id number]]  (Abnormal) Collected:  12/17/17 1244    Specimen:  Blood from Arm, Left Updated:  12/17/17 1506     proBNP 1788.0 (H) pg/mL     Narrative:       Among patients with dyspnea, NT-proBNP is highly sensitive for the detection of acute congestive heart failure. In addition NT-proBNP of <300 pg/ml effectively rules out acute congestive heart failure with 99% negative predictive value.    Troponin [[id number]]  (Abnormal) Collected:  12/17/17 1244    Specimen:  Blood from Arm, Left Updated:  12/17/17 1507     Troponin T 0.033 (H) ng/mL     Narrative:       Troponin T Reference Ranges:  Less than 0.03 ng/mL:    Negative for AMI  0.03 to 0.09 ng/mL:      Indeterminant for AMI  Greater than 0.09 ng/mL: Positive for AMI    aPTT [[id number]]  (Normal) Collected:  12/17/17 1243    Specimen:  Blood from Arm, Left Updated:  12/17/17 1517     PTT 29.4 seconds     CBC & Differential [[id number]] Collected:  12/17/17 1713    Specimen:  Blood Updated:  12/17/17 1726    Narrative:       The following orders were created for panel order CBC & Differential.  Procedure                               Abnormality         Status                     ---------                               -----------         ------                     CBC Auto Differential[[id number]]        Abnormal            Final result                 Please view results for these tests on the individual orders.    CBC Auto Differential [[id number]]  (Abnormal) Collected:  12/17/17 1713     Specimen:  Blood Updated:  12/17/17 1726     WBC 11.00 (H) 10*3/mm3      RBC 5.08 10*6/mm3      Hemoglobin 14.8 g/dL      Hematocrit 45.9 (H) %      MCV 90.4 fL      MCH 29.1 pg      MCHC 32.2 (L) g/dL      RDW 13.6 (H) %      RDW-SD 44.7 fl      MPV 9.6 fL      Platelets 138 (L) 10*3/mm3      Neutrophil % 69.1 %      Lymphocyte % 23.2 %      Monocyte % 6.5 %      Eosinophil % 0.8 %      Basophil % 0.2 %      Immature Grans % 0.2 %      Neutrophils, Absolute 7.60 10*3/mm3      Lymphocytes, Absolute 2.55 10*3/mm3      Monocytes, Absolute 0.72 10*3/mm3      Eosinophils, Absolute 0.09 10*3/mm3      Basophils, Absolute 0.02 10*3/mm3      Immature Grans, Absolute 0.02 10*3/mm3      nRBC 0.0 /100 WBC     Protime-INR [[id number]]  (Abnormal) Collected:  12/17/17 1713    Specimen:  Blood Updated:  12/17/17 1811     Protime 14.2 Seconds      INR 1.14 (H)    aPTT [[id number]]  (Abnormal) Collected:  12/17/17 2058    Specimen:  Blood Updated:  12/17/17 2122     .2 (H) seconds     CBC & Differential [[id number]] Collected:  12/18/17 0537    Specimen:  Blood Updated:  12/18/17 0632    Narrative:       The following orders were created for panel order CBC & Differential.  Procedure                               Abnormality         Status                     ---------                               -----------         ------                     CBC Auto Differential[[id number]]        Abnormal            Final result                 Please view results for these tests on the individual orders.    CBC Auto Differential [[id number]]  (Abnormal) Collected:  12/18/17 0537    Specimen:  Blood Updated:  12/18/17 0632     WBC 9.11 10*3/mm3      RBC 5.06 10*6/mm3      Hemoglobin 14.6 g/dL      Hematocrit 46.9 (H) %      MCV 92.7 fL      MCH 28.9 pg      MCHC 31.1 (L) g/dL      RDW 14.0 (H) %      RDW-SD 46.5 fl      MPV 10.2 fL      Platelets 134 (L) 10*3/mm3      Neutrophil % 64.2 %      Lymphocyte % 26.5 %      Monocyte % 7.9 %       Eosinophil % 0.8 %      Basophil % 0.4 %      Immature Grans % 0.2 %      Neutrophils, Absolute 5.85 10*3/mm3      Lymphocytes, Absolute 2.41 10*3/mm3      Monocytes, Absolute 0.72 10*3/mm3      Eosinophils, Absolute 0.07 10*3/mm3      Basophils, Absolute 0.04 10*3/mm3      Immature Grans, Absolute 0.02 10*3/mm3     aPTT [[id number]]  (Abnormal) Collected:  12/18/17 0537    Specimen:  Blood Updated:  12/18/17 0636     PTT 63.2 (H) seconds     Basic Metabolic Panel [[id number]]  (Abnormal) Collected:  12/18/17 0537    Specimen:  Blood Updated:  12/18/17 0639     Glucose 119 (H) mg/dL      BUN 13 mg/dL      Creatinine 0.94 mg/dL      Sodium 139 mmol/L      Potassium 4.9 mmol/L      Chloride 104 mmol/L      CO2 27.3 mmol/L      Calcium 9.8 mg/dL      eGFR Non African Amer 60 (L) mL/min/1.73      BUN/Creatinine Ratio 13.8     Anion Gap 7.7 mmol/L     Narrative:       GFR Normal >60  Chronic Kidney Disease <60  Kidney Failure <15        Imaging Results (last 24 hours)     Procedure Component Value Units Date/Time    CT Angiogram Chest With Contrast [[id number]] Collected:  12/17/17 1452     Updated:  12/17/17 1601    Narrative:       CT ANGIOGRAPHY OF THE CHEST WITH INTRAVENOUS CONTRAST AND 3D  RECONSTRUCTIONS     HISTORY: Right leg DVT. Shortness of breath.     FINDINGS:  The CT scan was performed as an emergency procedure with CT  angiography protocol using intravenous contrast and 3D reconstructions.  The following findings are present:  1. There are pulmonary emboli in the right main pulmonary artery  extending into the upper and lower lobe branches as well as multiple  pulmonary emboli in branches of both upper and lower lobes. The ratio of  the right ventricle to left ventricle is greater than 1, measuring 5.4  cm compared to 3.2 cm. These findings were called immediately to the  emergency room physician at the time of dictation.  2. The lungs are well-expanded and clear. There is no mediastinal, hilar  or axillary  adenopathy. There is no pericardial effusion.  3. The CT images through the upper liver, spleen, and both adrenal  glands appear normal.     Radiation dose reduction techniques were utilized, including automated  exposure control and exposure modulation based on body size.     This report was finalized on 12/17/2017 3:58 PM by Dr. Larry Cuevas MD.           ECG/EMG Results (last 24 hours)     Procedure Component Value Units Date/Time    Adult Transthoracic Echo Complete W/ Cont if Necessary Per Protocol [[id number]] Collected:  12/17/17 1547     Updated:  12/17/17 1757     BSA 2.0 m^2      IVSd 0.8 cm      LVIDd 3.4 cm      LVIDs 2.0 cm      LVPWd 1.0 cm      IVS/LVPW 0.8     FS 41.2 %      EDV(Teich) 47.4 ml      ESV(Teich) 12.7 ml      EF(Teich) 73.2 %      EDV(cubed) 39.3 ml      ESV(cubed) 8.0 ml      EF(cubed) 79.6 %      LV mass(C)d 84.9 grams      LV mass(C)dI 42.2 grams/m^2      SV(Teich) 34.7 ml      SI(Teich) 17.3 ml/m^2      SV(cubed) 31.3 ml      SI(cubed) 15.6 ml/m^2      Ao root diam 2.7 cm      Ao root area 5.7 cm^2      ACS 1.9 cm      LVOT diam 2.0 cm      LVOT area 3.1 cm^2      LVOT area(traced) 3.1 cm^2      RVOT diam 1.7 cm      RVOT area 2.3 cm^2      LVLd ap4 6.8 cm      EDV(MOD-sp4) 68.0 ml      LVLs ap4 4.6 cm      ESV(MOD-sp4) 16.0 ml      EF(MOD-sp4) 76.5 %      LVLd ap2 6.9 cm      EDV(MOD-sp2) 59.0 ml      LVLs ap2 5.5 cm      ESV(MOD-sp2) 15.0 ml      EF(MOD-sp2) 74.6 %      SV(MOD-sp4) 52.0 ml      SI(MOD-sp4) 25.9 ml/m^2      SV(MOD-sp2) 44.0 ml      SI(MOD-sp2) 21.9 ml/m^2      Ao root area (BSA corrected) 1.3     Ao root area (BSA corrected) 74.6 ml/m^2      CONTRAST EF 4CH 76.5 ml/m^2      LV Diastolic corrected for BSA 33.8 ml/m^2      LV Systolic corrected for BSA 8.0 ml/m^2      MV A dur 0.11 sec      MV E max thomas 63.2 cm/sec      MV A max thomas 102.0 cm/sec      MV E/A 0.62     MV V2 mean 66.6 cm/sec      MV mean PG 2.0 mmHg      MV V2 VTI 16.9 cm      MVA(VTI) 3.8 cm^2      MV  P1/2t max norman 56.2 cm/sec      MV P1/2t 59.2 msec      MVA(P1/2t) 3.7 cm^2      MV dec slope 278.0 cm/sec^2      MV dec time 0.22 sec      Ao V2 mean 107.0 cm/sec      Ao mean PG 5.0 mmHg      Ao mean PG (full) 1.0 mmHg      Ao V2 VTI 24.1 cm      YANCI(I,A) 2.7 cm^2      YANCI(I,D) 2.7 cm^2      LV V1 mean PG 4.0 mmHg      LV V1 mean 90.7 cm/sec      LV V1 VTI 20.7 cm      SV(Ao) 138.0 ml      SI(Ao) 68.6 ml/m^2      SV(LVOT) 65.0 ml      SV(RVOT) 23.4 ml      SI(LVOT) 32.3 ml/m^2      PA V2 max 72.1 cm/sec      PA max PG 2.1 mmHg      PA max PG (full) 0.84 mmHg       CV ECHO RENEE - PVA(V,A) 1.8 cm^2       CV ECHO RENEE - PVA(V,D) 1.8 cm^2      PA acc slope 654.0 cm/sec^2      PA acc time 0.11 sec      RV V1 max PG 1.2 mmHg      RV V1 mean PG 1.0 mmHg      RV V1 max 55.6 cm/sec      RV V1 mean 43.3 cm/sec      RV V1 VTI 10.3 cm      TR max norman 325.0 cm/sec      RVSP(TR) 50.3 mmHg      RAP systole 8.0 mmHg      PA pr(Accel) 30.0 mmHg      Pulm Sys Norman 63.7 cm/sec      Pulm Renteria Norman 34.6 cm/sec      Pulm S/D 1.8     Qp/Qs 0.36     Pulm A Revs Dur 0.13 sec      Pulm A Revs Norman 38.5 cm/sec      MVA P1/2T LCG 3.9 cm^2       CV ECHO RENEE - BZI_BMI 34.6 kilograms/m^2       CV ECHO RENEE - BSA(HAYCOCK) 2.1 m^2       CV ECHO RENEE - BZI_METRIC_WEIGHT 94.3 kg       CV ECHO RENEE - BZI_METRIC_HEIGHT 165.1 cm      Target HR (85%) 133 bpm      Max. Pred. HR (100%) 156 bpm       CV VAS BP RIGHT /92 mmHg      TDI S' 9.00 cm/sec      RV Base 3.20 cm      Dimensionless Index 0.9 (DI)      E/E' ratio 6.0     LA Volume Index 15.0 mL/m2      Ao pk norman 146.0 cm/sec      Lat Peak E' Norman 11.0 cm/sec      LV V1 max 125.0 cm/sec      Med Peak E' Norman 11.00 cm/sec      Ao max PG 9.0 mmHg      MV max PG 7 mmHg      TAPSE (>1.6) 1.40 cm2     Narrative:       · Left ventricular systolic function is hyperdynamic (EF > 70).  · Left ventricular diastolic dysfunction (grade I) consistent with   impaired relaxation.  · Right atrial  cavity size is mildly dilated.  · Right ventricular cavity is moderately dilated.  · Moderately reduced right ventricular systolic function noted.  · Mild tricuspid valve regurgitation is present.  · Estimated right ventricular systolic pressure from tricuspid   regurgitation is moderately elevated (45-55 mmHg).  · Calculated right ventricular systolic pressure from tricuspid   regurgitation is 50.3 mmHg.       ECG 12 Lead [[id number]] Collected:  17     Updated:  17    Narrative:       RR Interval= 674 ms  VA Interval= 152 ms  QRSD Interval= 84 ms  QT Interval= 404 ms  QTc Interval= 492 ms  Heart Rate= 89 ms  P Axis= 52 deg  QRS Axis= 52 deg  T Wave Axis= 38 deg  I: 40 Axis= 21 deg  T: 40 Axis= 103 deg  ST Axis= 139 deg  SINUS RHYTHM  PROBABLE LEFT ATRIAL ABNORMALITY  BORDERLINE PROLONGED QT INTERVAL  NO SIGNIFICANT CHANGE FROM PREVIOUS ECG  Electronically Signed by:  Arie Garcia 17-Dec-2017 20:26:20  Date and Time of Study: 2017 14:33:14             Physician Progress Notes (last 24 hours) (Notes from 2017 12:06 PM through 2017 12:06 PM)      Arie Garcia III, MD at 2017 11:45 AM  Version 2 of 2             Patient Name: Carlotta Rangel  :1955  62 y.o.      Patient Care Team:  Olivia Wheeler MD as PCP - General (Internal Medicine)    Chief Complaint: Pulmonary emboli    Interval History: less SOB on O2  Echo performed:  Interpretation Summary   · Left ventricular systolic function is hyperdynamic (EF > 70).  · Left ventricular diastolic dysfunction (grade I) consistent with impaired relaxation.  · Right atrial cavity size is mildly dilated.  · Right ventricular cavity is moderately dilated.  · Moderately reduced right ventricular systolic function noted.  · Mild tricuspid valve regurgitation is present.  · Estimated right ventricular systolic pressure from tricuspid regurgitation is moderately elevated (45-55 mmHg).  · Calculated right ventricular systolic  "pressure from tricuspid regurgitation is 50.3 mmHg.                Objective   Vital Signs  Temp:  [97.3 °F (36.3 °C)-100.4 °F (38 °C)] 98.8 °F (37.1 °C)  Heart Rate:  [] 84  Resp:  [16-20] 16  BP: (142-183)/() 150/94    Intake/Output Summary (Last 24 hours) at 12/18/17 1145  Last data filed at 12/18/17 0248   Gross per 24 hour   Intake              580 ml   Output              900 ml   Net             -320 ml     Flowsheet Rows         First Filed Value    Admission Height  165.1 cm (65\") Documented at 12/17/2017 1148    Admission Weight  94.3 kg (208 lb) Documented at 12/17/2017 1148          Physical Exam:   General Appearance:    Alert, cooperative, in no acute distress   Lungs:     Clear to auscultation.  Normal respiratory effort and rate.      Heart:    Regular rhythm and normal rate, normal S1 and S2, no murmurs, gallops or rubs.     Chest Wall:    No abnormalities observed   Abdomen:     Soft, nontender, positive bowel sounds.     Extremities:   no cyanosis, clubbing or edema.  No marked joint deformities.  Adequate musculoskeletal strength.       Results Review:      Results from last 7 days  Lab Units 12/18/17  0537   SODIUM mmol/L 139   POTASSIUM mmol/L 4.9   CHLORIDE mmol/L 104   CO2 mmol/L 27.3   BUN mg/dL 13   CREATININE mg/dL 0.94   GLUCOSE mg/dL 119*   CALCIUM mg/dL 9.8       Results from last 7 days  Lab Units 12/17/17  1244   TROPONIN T ng/mL 0.033*       Results from last 7 days  Lab Units 12/18/17  0537   WBC 10*3/mm3 9.11   HEMOGLOBIN g/dL 14.6   HEMATOCRIT % 46.9*   PLATELETS 10*3/mm3 134*       Results from last 7 days  Lab Units 12/18/17  0537 12/17/17  2058 12/17/17  1713 12/17/17  1243   INR   --   --  1.14* 1.06   APTT seconds 63.2* 123.2*  --  29.4                       Medication Review:     perflutren (DEFINITY) in 0.9% NS 10 mL syringe 2 mL Intravenous Once          heparin (porcine) 15.9 Units/kg/hr Last Rate: 14.9 Units/kg/hr (12/18/17 3578)       Assessment/Plan " "  Active Hospital Problems (** Indicates Principal Problem)    Diagnosis Date Noted   • Bilateral pulmonary embolism [I26.99] 2017      Resolved Hospital Problems    Diagnosis Date Noted Date Resolved   No resolved problems to display.     1.  Bilateral pulmonary emboli-for embolectomy today.     2.  Hematology has been consulted by Dr. Giles for evaluation given absence of clear provocation for her DVT and pulmonary emboli    Arie Garcia III, MD  [facility]  17  11:45 AM       Electronically signed by Arie Garcia III, MD at 2017 11:48 AM      Arie Garcia III, MD at 2017 11:45 AM  Version 1 of 2             Patient Name: Carlotta Rangel  :1955  62 y.o.      Patient Care Team:  Olivia Wheeler MD as PCP - General (Internal Medicine)    Chief Complaint: Pulmonary emboli    Interval History: less SOB on O2  Echo performed:  Interpretation Summary   · Left ventricular systolic function is hyperdynamic (EF > 70).  · Left ventricular diastolic dysfunction (grade I) consistent with impaired relaxation.  · Right atrial cavity size is mildly dilated.  · Right ventricular cavity is moderately dilated.  · Moderately reduced right ventricular systolic function noted.  · Mild tricuspid valve regurgitation is present.  · Estimated right ventricular systolic pressure from tricuspid regurgitation is moderately elevated (45-55 mmHg).  · Calculated right ventricular systolic pressure from tricuspid regurgitation is 50.3 mmHg.                Objective   Vital Signs  Temp:  [97.3 °F (36.3 °C)-100.4 °F (38 °C)] 98.8 °F (37.1 °C)  Heart Rate:  [] 84  Resp:  [16-20] 16  BP: (142-183)/() 150/94    Intake/Output Summary (Last 24 hours) at 17 1145  Last data filed at 17 0248   Gross per 24 hour   Intake              580 ml   Output              900 ml   Net             -320 ml     Flowsheet Rows         First Filed Value    Admission Height  165.1 cm (65\") Documented " at 12/17/2017 1148    Admission Weight  94.3 kg (208 lb) Documented at 12/17/2017 1148          Physical Exam:   General Appearance:    Alert, cooperative, in no acute distress   Lungs:     Clear to auscultation.  Normal respiratory effort and rate.      Heart:    Regular rhythm and normal rate, normal S1 and S2, no murmurs, gallops or rubs.     Chest Wall:    No abnormalities observed   Abdomen:     Soft, nontender, positive bowel sounds.     Extremities:   no cyanosis, clubbing or edema.  No marked joint deformities.  Adequate musculoskeletal strength.       Results Review:      Results from last 7 days  Lab Units 12/18/17  0537   SODIUM mmol/L 139   POTASSIUM mmol/L 4.9   CHLORIDE mmol/L 104   CO2 mmol/L 27.3   BUN mg/dL 13   CREATININE mg/dL 0.94   GLUCOSE mg/dL 119*   CALCIUM mg/dL 9.8       Results from last 7 days  Lab Units 12/17/17  1244   TROPONIN T ng/mL 0.033*       Results from last 7 days  Lab Units 12/18/17  0537   WBC 10*3/mm3 9.11   HEMOGLOBIN g/dL 14.6   HEMATOCRIT % 46.9*   PLATELETS 10*3/mm3 134*       Results from last 7 days  Lab Units 12/18/17  0537 12/17/17  2058 12/17/17  1713 12/17/17  1243   INR   --   --  1.14* 1.06   APTT seconds 63.2* 123.2*  --  29.4                       Medication Review:     perflutren (DEFINITY) in 0.9% NS 10 mL syringe 2 mL Intravenous Once          heparin (porcine) 15.9 Units/kg/hr Last Rate: 14.9 Units/kg/hr (12/18/17 0725)       Assessment/Plan   Active Hospital Problems (** Indicates Principal Problem)    Diagnosis Date Noted   • Bilateral pulmonary embolism [I26.99] 12/17/2017      Resolved Hospital Problems    Diagnosis Date Noted Date Resolved   No resolved problems to display.     1.  Bilateral pulmonary emboli-for embolectomy today.       Arie Garcia III, MD  [facility]  12/18/17  11:45 AM       Electronically signed by Arie Gacria III, MD at 12/18/2017 11:47 AM

## 2017-12-19 NOTE — PROGRESS NOTES
Discharge Planning Assessment  [facility]     Patient Name: Carlotta Rangel  MRN: [id number]  Today's Date: 12/19/2017    Admit Date: 12/17/2017          Discharge Needs Assessment       12/19/17 1550    Living Environment    Lives With spouse    Living Arrangements house    Home Accessibility no concerns;bed and bath on same level    Stair Railings at Home outside, present at both sides    Type of Financial/Environmental Concern none    Transportation Available family or friend will provide;car    Living Environment    Provides Primary Care For no one    Quality Of Family Relationships supportive;helpful;involved    Able to Return to Prior Living Arrangements yes    Discharge Needs Assessment    Concerns To Be Addressed denies needs/concerns at this time;no discharge needs identified    Readmission Within The Last 30 Days no previous admission in last 30 days    Equipment Currently Used at Home none    Equipment Needed After Discharge none    Discharge Disposition still a patient    Discharge Contact Information if Applicable Chi Rangel spouse [phone]            Discharge Plan       12/19/17 1551    Case Management/Social Work Plan    Plan Home.  Denies d/c needs.      Patient/Family In Agreement With Plan yes    Additional Comments Spoke with Pt and spouse Chi at bedside.  CCP introduced self and role.  Pt confirmed information on face sheet.  Pt stated she is IADL'S, works full time at [employer].  Pt denies past home health, subacute rehab and DME.  Pt confirms pharmacy as [facility] on [address].  Pt denies issues with affording her medications.  Pt plans to return home at discharge and denies needs at this time.  CCP will continue to follow…………….CAROLIN RN/CCP        Discharge Placement     No information found                Demographic Summary       12/19/17 1507    Referral Information    Admission Type inpatient    Arrived From home or self-care    Referral Source admission list    Reason For  Consult discharge planning    Record Reviewed medical record    Contact Information    Permission Granted to Share Information With family/designee    Primary Care Physician Information    Name Olivia Wheeler MD            Functional Status       12/19/17 1548    Functional Status Current    Ambulation 2-->assistive person    Transferring 2-->assistive person    Toileting 2-->assistive person    Bathing 0-->independent    Dressing 0-->independent    Eating 0-->independent    Communication 0-->understands/communicates without difficulty    Swallowing (if score 2 or more for any item, consult Rehab Services) 0-->swallows foods/liquids without difficulty    Change in Functional Status Since Onset of Current Illness/Injury yes   assist for safety d/t DVT    Functional Status Prior    Ambulation 0-->independent    Transferring 0-->independent    Toileting 0-->independent    Bathing 0-->independent    Dressing 0-->independent    Eating 0-->independent    Communication 0-->understands/communicates without difficulty    Swallowing 0-->swallows foods/liquids without difficulty    IADL    Medications independent    Meal Preparation independent    Housekeeping independent    Laundry independent    Shopping independent    Oral Care independent    Activity Tolerance    Usual Activity Tolerance good    Current Activity Tolerance moderate    Cognitive/Perceptual/Developmental    Current Mental Status/Cognitive Functioning no deficits noted            Psychosocial     None            Abuse/Neglect     None            Legal     None            Substance Abuse     None            Patient Forms     None          Aury Rivera RN

## 2017-12-19 NOTE — CONSULTS
Subjective .     REASON FOR CONSULTATION:   bilateral PE and right extremity DVT  Provide an opinion on any further workup or treatment                             REQUESTING PHYSICIAN: Ivan Giles MD  RECORDS OBTAINED:  Records of the patients history including those obtained from the referring provider were reviewed and summarized in detail.    HISTORY OF PRESENT ILLNESS:  The patient is a 62 y.o. year old female  who is here for follow-up with the above-mentioned history.    5-6 days right calf pain with dyspnea on exertion ×1 day and tachycardia prompted an ER evaluation.  On 12/17/17: Acute proximal femoral, mid femoral, distal femoral, popliteal, posterior tibial, paraneural, gastrocnemius/soleal DVT.  On 12/17/17, CT chest: Right main pulmonary artery emboli extending into upper and lower branches and multiple emboli of bilateral upper and bilateral lower lobes.    Some residual discomfort in the right leg and some residual cough.  Plans are for discharge today.  She's had no bleeding problems.    States she has no prior history of clotting and no family history of clotting.  States she is up-to-date on breast exams, mammograms.  However, she has not had a colonoscopy.  She plans to do this in the future.  States she is overdue for a Pap/pelvic exam.  Plans to do this.    Denies weight loss.    Denies any recent travels.  However, sits for 2-2-1/2 hours at a time often at work.    Past Medical History:   Diagnosis Date   • Disease of thyroid gland    • Diverticulitis    • Essential hypertension 5/25/2017     Past Surgical History:   Procedure Laterality Date   • CARDIAC CATHETERIZATION Bilateral 12/18/2017    Procedure: Pulmonary angiography - possible thrombectomy;  Surgeon: Ivan Giles MD;  Location: [facility] INVASIVE LOCATION;  Service:    • CHOLECYSTECTOMY     • CYST REMOVAL      left breast   • TONSILLECTOMY         HEMATOLOGIC/ONCOLOGIC HISTORY:  (History from previous dates can be found in the  "separate document.)    MEDICATIONS    Current Facility-Administered Medications:   •  acetaminophen (TYLENOL) tablet 650 mg, 650 mg, Oral, Q4H PRN, Jennifer ABDULAZIZ Johns, APRN  •  apixaban (ELIQUIS) tablet 10 mg, 10 mg, Oral, Q12H, Arie Garcia III, MD, 10 mg at 12/19/17 1108  •  heparin (porcine) 5000 UNIT/ML injection 3,800-7,500 Units, 40-80 Units/kg, Intravenous, Q6H PRN, Ivan Giles MD  •  hydrALAZINE (APRESOLINE) injection 10 mg, 10 mg, Intravenous, Q6H PRN, Jennifer Noelper, APRN, 10 mg at 12/17/17 1735  •  nitroglycerin (NITROSTAT) SL tablet 0.4 mg, 0.4 mg, Sublingual, Q5 Min PRN, Jennifer Johns, APRN  •  ondansetron (ZOFRAN) injection 4 mg, 4 mg, Intravenous, Q6H PRN, Jennifer Johns, APRN  •  sodium chloride 0.9 % flush 1-10 mL, 1-10 mL, Intravenous, PRN, Ivan Giles MD  •  sodium chloride 0.9 % flush 1-10 mL, 1-10 mL, Intravenous, PRN, Jennifer Johns, APRN    ALLERGIES:     Allergies   Allergen Reactions   • Other GI Intolerance     NOVACAINE causes nausea and vomiting   • Ciprofloxacin      \"experienced too many side effects\"    • Codeine GI Intolerance     Nausea, vomiting   • Sulfa Antibiotics GI Intolerance     Nausea and vomiting       SOCIAL HISTORY:       Social History     Social History   • Marital status:      Spouse name: N/A   • Number of children: N/A   • Years of education: N/A     Occupational History   • Not on file.     Social History Main Topics   • Smoking status: Never Smoker   • Smokeless tobacco: Never Used   • Alcohol use No   • Drug use: No   • Sexual activity: Defer     Other Topics Concern   • Not on file     Social History Narrative         FAMILY HISTORY:  Family History   Problem Relation Age of Onset   • Stroke Mother        REVIEW OF SYSTEMS:  GENERAL: No change in appetite or weight;   No fevers, chills, sweats.    SKIN: No nonhealing lesions.   No rashes.  HEME/LYMPH: No easy bruising, bleeding.   No swollen nodes.   EYES: No vision changes or diplopia.   ENT: No " tinnitus, hearing loss, gum bleeding, epistaxis, hoarseness or dysphagia.   RESPIRATORY: see HPI   CVS: No chest pain, palpitations, orthopnea, dyspnea on exertion or PND.   GI: No melena or hematochezia.   No abdominal pain.  No nausea, vomiting, constipation, diarrhea  : No lower tract obstructive symptoms, dysuria or hematuria.   MUSCULOSKELETAL: No bone pain.  No joint stiffness.   NEUROLOGICAL: No global weakness, loss of consciousness or seizures.   PSYCHIATRIC: No increased nervousness, mood changes or depression.     Objective    Vitals:    12/18/17 2305 12/19/17 0404 12/19/17 0741 12/19/17 1117   BP: 152/80 155/86 145/90 149/83   BP Location: Left arm Left arm Left arm Left arm   Patient Position: Lying Lying Lying Sitting   Pulse: 82 80 81 77   Resp: 18 18 18   Temp: 97.1 °F (36.2 °C)  97.9 °F (36.6 °C) 97.8 °F (36.6 °C)   TempSrc: Oral   Oral   SpO2: 96% 100% 96% 97%   Weight:       Height:         No flowsheet data found.   PHYSICAL EXAM:    GENERAL:  Well-developed, well-nourished in no acute distress.   SKIN:  Warm, dry without rashes, purpura or petechiae.  EYES:  Pupils equal, round and reactive to light.  EOMs intact.  Conjunctivae normal.  EARS:  Hearing intact.  NOSE:  Septum midline.  No excoriations or nasal discharge.  MOUTH:  Tongue is well-papillated; no stomatitis or ulcers.  Lips normal.  THROAT:  Oropharynx without lesions or exudates.  NECK:  Supple with good range of motion; no thyromegaly or masses, no JVD.  LYMPHATICS:  No cervical, supraclavicular, axillary or inguinal adenopathy.  CHEST:  Lungs clear to auscultation. Good airflow.  CARDIAC:  Regular rate and rhythm without murmurs, rubs or gallops. Normal S1,S2.  ABDOMEN:  Soft, nontender with no hepatosplenomegaly or masses.  EXTREMITIES:  No clubbing, cyanosis.Right leg larger than left  NEUROLOGICAL:  Cranial Nerves II-XII grossly intact.  No focal neurological deficits.  PSYCHIATRIC:  Normal affect and mood.    RECENT LABS:         WBC   Date Value Ref Range Status   12/19/2017 7.00 4.50 - 10.70 10*3/mm3 Final   12/18/2017 9.11 4.50 - 10.70 10*3/mm3 Final   12/17/2017 11.00 (H) 4.50 - 10.70 10*3/mm3 Final   12/17/2017 8.49 4.50 - 10.70 10*3/mm3 Final     Hemoglobin   Date Value Ref Range Status   12/19/2017 13.4 11.9 - 15.5 g/dL Final   12/18/2017 13.3 12.0 - 17.0 g/dL Final   12/18/2017 14.6 11.9 - 15.5 g/dL Final   12/17/2017 14.8 11.9 - 15.5 g/dL Final   12/17/2017 14.8 11.9 - 15.5 g/dL Final     Platelets   Date Value Ref Range Status   12/19/2017 155 140 - 500 10*3/mm3 Final   12/18/2017 134 (L) 140 - 500 10*3/mm3 Final   12/17/2017 138 (L) 140 - 500 10*3/mm3 Final   12/17/2017 144 140 - 500 10*3/mm3 Final       Assessment/Plan   *Right leg femoral DVT and bilateral pulmonary emboli, 12/17/17.  On 12/17/17: Acute right proximal femoral, mid femoral, distal femoral, popliteal, posterior tibial, paraneural, gastrocnemius/soleal DVT.  On 12/17/17, CT chest: Right main pulmonary artery emboli extending into upper and lower branches and multiple emboli of bilateral upper and bilateral lower lobes.  On Eliquis through cardiology 10 mg twice per day ×7 days, then 5 mg twice per day..  Did not need embolectomy.    *Risk factors for clotting.  No family history.  Has not been on HRT.  Sits 2-2-1/2 hours at work oftentimes without getting up (advised to avoid prolonged sitting).    *Cancer screening.  States she is up-to-date on breast exam/mammograms.  Never had a colonoscopy.  Years overdue on Pap/pelvic exams.  Advised to have a Pap/pelvic exam.  Advised to have a colonoscopy after 6 months of anticoagulation.  She was told by me malignancy increases the risk of clotting.  (Would like to avoid stopping anticoagulation for colonoscopy for 6 months.  At least, try to avoid stopping anticoagulation for the first month).    *Length of anticoagulation.  Check hypercoagulable labs.  However, regardless of results, recommended patient and   to continue anticoagulation lifelong, unless she develops a contraindication, considering the large clot burden and the fact that this appears unprovoked (was sitting 2-2-1/2 hours at a time at work, but it is difficult to always avoid sitting 2 hours at a time in the future).    *Thrombocytopenia, mild, resolved.  Suspect this was due to the clotting event.  Check another CBC when she sees me in the office.    *Leukocytosis.  Mild.  Resolved.  No further workup planned.    Plan  Hypercoagulable labs today  Noticed plans for discharge today  Follow-up with me with CBC in roughly 2 weeks to review the results.     was present.  CT images personally reviewed by me.

## 2017-12-19 NOTE — PROGRESS NOTES
"    Patient Name: Carlotta Rangel  :1955  62 y.o.      Patient Care Team:  Olivia Wheeler MD as PCP - General (Internal Medicine)    Chief Complaint: Pulmonary emboli    Interval History: less SOB on O2  Echo performed:  Interpretation Summary   · Left ventricular systolic function is hyperdynamic (EF > 70).  · Left ventricular diastolic dysfunction (grade I) consistent with impaired relaxation.  · Right atrial cavity size is mildly dilated.  · Right ventricular cavity is moderately dilated.  · Moderately reduced right ventricular systolic function noted.  · Mild tricuspid valve regurgitation is present.  · Estimated right ventricular systolic pressure from tricuspid regurgitation is moderately elevated (45-55 mmHg).  · Calculated right ventricular systolic pressure from tricuspid regurgitation is 50.3 mmHg.                Objective   Vital Signs  Temp:  [97.1 °F (36.2 °C)-98.8 °F (37.1 °C)] 97.9 °F (36.6 °C)  Heart Rate:  [67-92] 81  Resp:  [16-28] 18  BP: (145-167)/(80-94) 145/90    Intake/Output Summary (Last 24 hours) at 17 0933  Last data filed at 17 0852   Gross per 24 hour   Intake              510 ml   Output                0 ml   Net              510 ml     Flowsheet Rows         First Filed Value    Admission Height  165.1 cm (65\") Documented at 2017 1148    Admission Weight  94.3 kg (208 lb) Documented at 2017 1148          Physical Exam:   General Appearance:    Alert, cooperative, in no acute distress   Lungs:     Clear to auscultation.  Normal respiratory effort and rate.      Heart:    Regular rhythm and normal rate, normal S1 and S2, no murmurs, gallops or rubs.     Chest Wall:    No abnormalities observed   Abdomen:     Soft, nontender, positive bowel sounds.     Extremities:   no cyanosis, clubbing or edema.  No marked joint deformities.  Adequate musculoskeletal strength.       Results Review:      Results from last 7 days  Lab Units 17  0359   SODIUM mmol/L 139 "   POTASSIUM mmol/L 4.2   CHLORIDE mmol/L 104   CO2 mmol/L 22.0   BUN mg/dL 16   CREATININE mg/dL 0.87   GLUCOSE mg/dL 106*   CALCIUM mg/dL 9.2       Results from last 7 days  Lab Units 12/17/17  1244   TROPONIN T ng/mL 0.033*       Results from last 7 days  Lab Units 12/19/17  0359   WBC 10*3/mm3 7.00   HEMOGLOBIN g/dL 13.4   HEMATOCRIT % 42.1   PLATELETS 10*3/mm3 155       Results from last 7 days  Lab Units 12/19/17  0359 12/18/17  1950 12/18/17  1343  12/17/17  1713 12/17/17  1243   INR  1.09  --   --   --  1.14* 1.06   APTT seconds 92.2* 107.5* 67.9*  < >  --  29.4   < > = values in this interval not displayed.                    Medication Review:           heparin (porcine) 15.9 Units/kg/hr Last Rate: 14.7 Units/kg/hr (12/19/17 0854)       Assessment/Plan   Active Hospital Problems (** Indicates Principal Problem)    Diagnosis Date Noted   • Bilateral pulmonary embolism [I26.99] 12/17/2017      Resolved Hospital Problems    Diagnosis Date Noted Date Resolved   No resolved problems to display.     1.  Bilateral pulmonary emboli-improved with heparin, did not need embolectomy. D/W Dr Giles- will switch to eliquis 10 mg BID for 7 days, then 5 mg BID thereafter     2.  Hematology had consulted by Dr. Giles for evaluation given absence of clear provocation for her DVT and pulmonary emboli on Sunday but not yet seen- will recall this AM.    Will d/c O2- potential D/c based on heme recs    Arie Garcia III, MD  [facility]  12/19/17  9:33 AM

## 2017-12-19 NOTE — DISCHARGE SUMMARY
Date of Discharge:  12/19/2017  Date of Admit: 12/17/2017    Discharge Diagnosis:Active Problems:    Bilateral pulmonary embolism      Hospital Course: The patient was admitted for R calf pain and tachycardia.  She had dypsnea doing normal daily activities.  She was found to have a right femoral to calf DVT.  She also had intermediate-high risk bilateral pulmonary embolism.    She was treated with heparin overnight.  The next day, pulmonary angiography demonstrated resolution of most of the thrombus.  No catheter-directed therapy was performed.    She was seen by Dr. Núñez of the Hematology service who recommended lifelong anticoagulation.    She was started on Eliquis and is now ready for discharge.    Procedures Performed  Procedure(s):  Pulmonary angiography - possible thrombectomy       Consults     Date and Time Order Name Status Description    12/19/2017 0811 Inpatient Consult to Hematology & Oncology Completed     12/17/2017 1455 Inpatient Consult to Hematology & Oncology Completed             Discharge Physical Exam:    General Appearance No acute distress   Neck No adenopathy, supple, trachea midline, no thyromegaly, no carotid bruit, no JVD   Lungs Clear to auscultation,respirations regular, even and unlabored   Heart Regular rhythm and normal rate, normal S1 and S2, no murmur, no gallop, no rub, no click   Chest wall No abnormalities observed   Abdomen Normal bowel sounds, no masses, no hepatomegaly, soft   Extremities Moves all extremities well, no edema, no cyanosis, no redness   Neurological Alert and oriented x 3     Discharge Medications   Carlotta Rangel   Home Medication Instructions MALINDA:[id number]    Printed on:12/19/17 1611   Medication Information                      apixaban (ELIQUIS) 5 MG tablet tablet  Take 2 tablets by mouth Every 12 (Twelve) Hours for 7 days.             apixaban (ELIQUIS) 5 MG tablet tablet  Take 1 tablet by mouth Every 12 (Twelve) Hours.             aspirin 81 MG  tablet  Take 1 tablet by mouth Daily.             cefdinir (OMNICEF) 300 MG capsule  Take 300 mg by mouth 2 (Two) Times a Day.             cholecalciferol (VITAMIN D3) 1000 units tablet  Take 1,000 Units by mouth Daily.             losartan (COZAAR) 50 MG tablet  Take 1 tablet by mouth Daily.             metroNIDAZOLE (FLAGYL) 500 MG tablet  Take 500 mg by mouth 3 (Three) Times a Day.             MULTIPLE VITAMINS-MINERALS PO  Take 1 tablet/day by mouth Daily.             Zinc 100 MG tablet  Take 100 mg by mouth Daily.                 Discharge Diet: ad crystal    Activity at Discharge: light activity    Discharge disposition: home    Condition on Discharge: stable    Follow-up Appointments  Future Appointments  Date Time Provider Department Center   1/4/2018 8:45 AM Ramos Kwok MD [facility] None   1/4/2018 3:50 PM LAB CHAIR 6 [facility] [place]  [facility]   1/4/2018 4:20 PM José Miguel Núñez II, MD [facility]     Additional Instructions for the Follow-ups that You Need to Schedule     Discharge Follow-up with Specified Provider: Dr. Giles in 4 weeks    As directed    To:  Dr. Giles in 4 weeks                      Ivan Giles MD  12/19/17  4:11 PM

## 2017-12-19 NOTE — PLAN OF CARE
Problem: Patient Care Overview (Adult)  Goal: Plan of Care Review  Outcome: Ongoing (interventions implemented as appropriate)    12/19/17 0536   Coping/Psychosocial Response Interventions   Plan Of Care Reviewed With patient   Patient Care Overview   Progress improving   Outcome Evaluation   Outcome Summary/Follow up Plan Pt had a cath with planned thrombectomy yesterday, no intervention needed r/t clot disolved. right fem site asymtomatic, drsg CDI, pt denies pain, continues hep gtt       Goal: Discharge Needs Assessment  Outcome: Ongoing (interventions implemented as appropriate)    Problem: VTE, DVT and PE (Adult)  Goal: Signs and Symptoms of Listed Potential Problems Will be Absent or Manageable (VTE, DVT and PE)  Outcome: Ongoing (interventions implemented as appropriate)    Problem: Pain, Acute (Adult)  Goal: Identify Related Risk Factors and Signs and Symptoms  Outcome: Ongoing (interventions implemented as appropriate)  Goal: Acceptable Pain Control/Comfort Level  Outcome: Ongoing (interventions implemented as appropriate)

## 2017-12-19 NOTE — TELEPHONE ENCOUNTER
----- Message from José Miguel Núñez II, MD sent at 12/19/2017  3:08 PM EST -----   Unsure if I already sent this message.  If not:    Appointment with me, 2 units, 2–3 weeks with CBC that day.  (If possible, she prefers the [facility] office)

## 2017-12-21 LAB
CARDIOLIPIN IGA SER IA-ACNC: <9 APL U/ML (ref 0–11)
CARDIOLIPIN IGG SER IA-ACNC: <9 GPL U/ML (ref 0–14)
CARDIOLIPIN IGM SER IA-ACNC: 12 MPL U/ML (ref 0–12)

## 2017-12-22 LAB
APTT HEX PL PPP: 12 SEC (ref 0–11)
DRVVT IMM 1:2 NP PPP: 52.2 SEC (ref 0–47)
INCUBATED PTT LA MIX: 51.1 SEC (ref 0–48.9)
LA NT DPL PPP: 49.4 SEC (ref 0–55)
LA NT DPL/LA NT HPL PPP-RTO: 0.93 RATIO (ref 0–1.4)
LA NT PLATELET PPP: 54.9 SEC (ref 0–51.9)
LUPUS ANTICOAGULANT REFLEX: ABNORMAL
PTT LA MIX: 44.8 SEC (ref 0–48.9)
SCREEN DRVVT: 1.2 RATIO (ref 0.8–1.2)
SCREEN DRVVT: 83.4 SEC (ref 0–47)
THROMBIN TIME: 19.1 SEC (ref 0–23)

## 2017-12-23 LAB
B2 GLYCOPROT1 IGA SER-ACNC: <9 GPI IGA UNITS (ref 0–25)
B2 GLYCOPROT1 IGG SER-ACNC: <9 GPI IGG UNITS (ref 0–20)
B2 GLYCOPROT1 IGM SER-ACNC: <9 GPI IGM UNITS (ref 0–32)

## 2017-12-26 LAB
F2 GENE MUT ANL BLD/T: NORMAL
F5 GENE MUT ANL BLD/T: NORMAL
FACTOR V COMMENT: NORMAL
Lab: NORMAL

## 2017-12-28 ENCOUNTER — TELEPHONE (OUTPATIENT)
Dept: ONCOLOGY | Facility: HOSPITAL | Age: 62
End: 2017-12-28

## 2017-12-28 NOTE — TELEPHONE ENCOUNTER
Received call from daughter in law asking about lab results that may have come back positive and if she should have her children checked for anything that could be genetic.  Message sent to Dr. Núñez for review.

## (undated) DEVICE — CATH PULM GPC PE 4SD/PRT 6.7F 100CM

## (undated) DEVICE — GW EMR FIX EXCHG J STD .035 3MM 260CM

## (undated) DEVICE — PK CATH CARD 40

## (undated) DEVICE — Device

## (undated) DEVICE — RADIFOCUS GLIDEWIRE ADVANTAGE GUIDEWIRE: Brand: GLIDEWIRE ADVANTAGE

## (undated) DEVICE — KT MANIFLD CARDIAC

## (undated) DEVICE — PREF.GUIDING SHEATH W/MULT.CRV: Brand: PREFACE

## (undated) DEVICE — INTRO SHEATH DRYSEAL FLEX 22F 7.3TO7.3MM 33CM

## (undated) DEVICE — CATH PULM WEDGE PRESS 7F

## (undated) DEVICE — HI-TORQUE SUPRA CORE .035 PERIPHERAL GUIDE WIRE .035 X 300 CM: Brand: HI-TORQUE SUPRA CORE